# Patient Record
Sex: FEMALE | Race: WHITE | NOT HISPANIC OR LATINO | Employment: OTHER | URBAN - METROPOLITAN AREA
[De-identification: names, ages, dates, MRNs, and addresses within clinical notes are randomized per-mention and may not be internally consistent; named-entity substitution may affect disease eponyms.]

---

## 2022-11-07 ENCOUNTER — TELEPHONE (OUTPATIENT)
Dept: WOUND CARE | Facility: CLINIC | Age: 87
End: 2022-11-07

## 2022-11-07 DIAGNOSIS — L97.529 ULCER OF TOE OF LEFT FOOT, UNSPECIFIED ULCER STAGE (H): Primary | ICD-10-CM

## 2022-11-07 NOTE — TELEPHONE ENCOUNTER
Patient's daughter, Sarah, called requesting a new patient appt for her mom who has a pressure ulcer on the bottom of her foot. She's had two failed skin grafts. Writer informed Sarah of the possible wait time for an appt at the [facility] and offered her the info for the [facility], but she prefers to try the [facility] first.     Sarah [phone]

## 2022-11-07 NOTE — TELEPHONE ENCOUNTER
Consult received via Phone from daughter for wound of the left great toe.    Please schedule with Dr. Rodriguez, Dr. Loya, Dr. Mccabe, or Sheree VARMA at [facility] [place] at next available appointment.    Patient has history of smoking. Per Standing Order patient qualifies for JOSE to be scheduled. JOSE is preferred to be completed before visit with provider but not required. Please do not order an JOSE if scheduled with Dr. Rodriguez.    Is patient a WENCESLAO lift?  PLEASE INQUIRE WHEN MAKING THE APPOINTMENT AND PUT IN APPOINTMENT NOTES    Order and pend Bilateral JOSE without exercise with note to be scheduled by [facility] staff only and include toe pressures.         Once the provider appointment is made please route to [facility] Appointment Scheduling Pool to schedule the JOSE appointment.

## 2022-11-11 ENCOUNTER — TELEPHONE (OUTPATIENT)
Dept: WOUND CARE | Facility: CLINIC | Age: 87
End: 2022-11-11

## 2022-11-11 ENCOUNTER — HOSPITAL ENCOUNTER (OUTPATIENT)
Dept: ULTRASOUND IMAGING | Facility: CLINIC | Age: 87
Discharge: HOME | End: 2022-11-11
Attending: SURGERY
Payer: MEDICARE

## 2022-11-11 ENCOUNTER — HOSPITAL ENCOUNTER (OUTPATIENT)
Dept: WOUND CARE | Facility: CLINIC | Age: 87
Discharge: HOME | End: 2022-11-11
Attending: SURGERY
Payer: MEDICARE

## 2022-11-11 VITALS
WEIGHT: 188.5 LBS | HEIGHT: 65 IN | DIASTOLIC BLOOD PRESSURE: 65 MMHG | SYSTOLIC BLOOD PRESSURE: 109 MMHG | BODY MASS INDEX: 31.4 KG/M2 | HEART RATE: 126 BPM | TEMPERATURE: 97.5 F

## 2022-11-11 DIAGNOSIS — L97.511 SKIN ULCER OF TOE OF RIGHT FOOT, LIMITED TO BREAKDOWN OF SKIN (H): ICD-10-CM

## 2022-11-11 DIAGNOSIS — L97.511 SKIN ULCER OF TOE OF RIGHT FOOT, LIMITED TO BREAKDOWN OF SKIN (H): Primary | ICD-10-CM

## 2022-11-11 DIAGNOSIS — L97.522 CHRONIC TOE ULCER, LEFT, WITH FAT LAYER EXPOSED (H): ICD-10-CM

## 2022-11-11 PROBLEM — R60.9 DEPENDENT EDEMA: Status: ACTIVE | Noted: 2022-04-26

## 2022-11-11 PROBLEM — D51.9 B12 DEFICIENCY ANEMIA: Status: ACTIVE | Noted: 2022-07-20

## 2022-11-11 PROBLEM — K92.2 GASTROINTESTINAL HEMORRHAGE: Status: ACTIVE | Noted: 2022-08-18

## 2022-11-11 PROBLEM — I48.91 NEW ONSET ATRIAL FIBRILLATION (H): Status: ACTIVE | Noted: 2022-06-17

## 2022-11-11 PROBLEM — N18.31 STAGE 3A CHRONIC KIDNEY DISEASE (H): Status: ACTIVE | Noted: 2022-04-26

## 2022-11-11 PROBLEM — E87.1 HYPONATREMIA: Status: ACTIVE | Noted: 2022-06-13

## 2022-11-11 PROBLEM — I48.19 PERSISTENT ATRIAL FIBRILLATION (H): Status: ACTIVE | Noted: 2022-06-17

## 2022-11-11 PROCEDURE — 99204 OFFICE O/P NEW MOD 45 MIN: CPT | Mod: 25 | Performed by: SURGERY

## 2022-11-11 PROCEDURE — 93922 UPR/L XTREMITY ART 2 LEVELS: CPT

## 2022-11-11 PROCEDURE — 11042 DBRDMT SUBQ TIS 1ST 20SQCM/<: CPT | Performed by: SURGERY

## 2022-11-11 PROCEDURE — G0463 HOSPITAL OUTPT CLINIC VISIT: HCPCS | Mod: 25

## 2022-11-11 RX ORDER — LATANOPROST 50 UG/ML
SOLUTION/ DROPS OPHTHALMIC
COMMUNITY
Start: 2022-10-31

## 2022-11-11 RX ORDER — PANTOPRAZOLE SODIUM 40 MG/1
TABLET, DELAYED RELEASE ORAL
COMMUNITY
Start: 2022-11-05

## 2022-11-11 RX ORDER — ACETAMINOPHEN 325 MG/1
650 TABLET ORAL
COMMUNITY

## 2022-11-11 RX ORDER — ALLOPURINOL 100 MG/1
100 TABLET ORAL DAILY
COMMUNITY
Start: 2022-09-29

## 2022-11-11 RX ORDER — OXYBUTYNIN CHLORIDE 5 MG/1
5 TABLET ORAL 2 TIMES DAILY
COMMUNITY
Start: 2022-10-28 | End: 2022-12-27

## 2022-11-11 RX ORDER — ASPIRIN 325 MG
325 TABLET ORAL
COMMUNITY
End: 2022-12-27

## 2022-11-11 RX ORDER — BUMETANIDE 2 MG/1
2 TABLET ORAL DAILY
COMMUNITY
Start: 2022-09-11

## 2022-11-11 RX ORDER — SILVER SULFADIAZINE 10 MG/G
1 CREAM TOPICAL
COMMUNITY
Start: 2022-09-21 | End: 2022-12-27

## 2022-11-11 RX ORDER — FOLIC ACID 1 MG/1
1000 TABLET ORAL DAILY
COMMUNITY
Start: 2022-09-19

## 2022-11-11 RX ORDER — DOCUSATE SODIUM 100 MG/1
100 CAPSULE, LIQUID FILLED ORAL
COMMUNITY

## 2022-11-11 RX ORDER — METOPROLOL SUCCINATE 100 MG/1
100 TABLET, EXTENDED RELEASE ORAL 2 TIMES DAILY
COMMUNITY
Start: 2022-11-02

## 2022-11-11 RX ORDER — AMOXICILLIN 500 MG/1
CAPSULE ORAL
COMMUNITY
Start: 2022-11-03

## 2022-11-11 RX ORDER — POTASSIUM CHLORIDE 1500 MG/1
TABLET, EXTENDED RELEASE ORAL
COMMUNITY
Start: 2022-11-02

## 2022-11-11 RX ORDER — CLOPIDOGREL BISULFATE 75 MG/1
75 TABLET ORAL DAILY
COMMUNITY
Start: 2022-11-04

## 2022-11-11 RX ORDER — CIPROFLOXACIN 500 MG/1
TABLET, FILM COATED ORAL
COMMUNITY
Start: 2022-06-13 | End: 2022-12-27

## 2022-11-11 NOTE — PROGRESS NOTES
[facility] [place] Progress Note    Subject: Tiffanie Merlos is an 87-year-old independent woman who lives in [facility].  She is the daughter of Sarah Matamoros RN  F[employer]  PACU nurse.  Patient developed a blister and ulceration of her left great toe in approximately June 2022.  She developed marked swelling and sepsis.  Admitted to [facility] where she was found to be in atrial fibrillation with congestive heart failure and multiple other medical issues.  Placed on medications for heart rate and intravenous antibiotics for the toe ulcer.  Toe ulcer has failed to heal.  She had been treating this with her local physicians and wound care specialist with a placed several types of dressings and amniotic type grafts that have failed to heal.        Work-up revealed no evidence of osteomyelitis.    She had the atrial fibrillation with a rapid ventricular response.  Watchman procedure performed 11/2/2022.    11/3/2022 laboratory sodium= 140   K= 4.5   Glucose= 116 SCr=1.20         Ca=8.2    Quit smoking 1976.  No history of PAD    With the ongoing toe issues her daughter brought her to live with her in the [facility].  She is basically homebound with limited ambulation.  Empirically placed on Cipro several days ago.    Thought she should be evaluated in the wound clinic.  Stage IIIa kidney disease  PMH: No past medical history on file.  Patient Active Problem List   Diagnosis     Age-related cataract of right eye     B12 deficiency anemia     Cellulitis of left lower extremity     Dependent edema     Hereditary and idiopathic peripheral neuropathy     Hypertension     Hyponatremia     Idiopathic chronic gout of left foot without tophus     Persistent atrial fibrillation (H)     Other urinary incontinence     Skin ulcer of toe of right foot, limited to breakdown of skin (H)     Stage 3a chronic kidney disease (H)     Gastrointestinal hemorrhage     Social Hx:   Social History      Socioeconomic History     Marital status:      Spouse name: Not on file     Number of children: Not on file     Years of education: Not on file     Highest education level: Not on file   Occupational History     Not on file   Tobacco Use     Smoking status: Not on file     Smokeless tobacco: Not on file   Substance and Sexual Activity     Alcohol use: Not on file     Drug use: Not on file     Sexual activity: Not on file   Other Topics Concern     Not on file   Social History Narrative     Not on file     Social Determinants of Health     Financial Resource Strain: Not on file   Food Insecurity: Not on file   Transportation Needs: Not on file   Physical Activity: Not on file   Stress: Not on file   Social Connections: Not on file   Intimate Partner Violence: Not on file   Housing Stability: Not on file       Surgical Hx: No past surgical history on file.    Allergies:    Allergies   Allergen Reactions     Ibuprofen Rash     ITCHY rash!  ITCHY rash!       Meperidine Nausea and Vomiting     Vomit x 3 days.  Vomit x 3 days.       Oxybutynin Rash     RASH from PATCH  RASH from PATCH       Pregabalin Swelling       Medications:   Current Outpatient Medications   Medication     silver sulfADIAZINE (SILVADENE) 1 % external cream     acetaminophen (TYLENOL) 325 MG tablet     allopurinol (ZYLOPRIM) 100 MG tablet     amoxicillin (AMOXIL) 500 MG capsule     aspirin (ASA) 325 MG tablet     bumetanide (BUMEX) 2 MG tablet     cholecalciferol 50 MCG (2000 UT) tablet     ciprofloxacin (CIPRO) 500 MG tablet     clopidogrel (PLAVIX) 75 MG tablet     diphenhydrAMINE-acetaminophen (TYLENOL PM)  MG tablet     docusate sodium (DSS) 100 MG capsule     folic acid (FOLVITE) 1 MG tablet     latanoprost (XALATAN) 0.005 % ophthalmic solution     metoprolol succinate ER (TOPROL XL) 100 MG 24 hr tablet     oxybutynin (DITROPAN) 5 MG tablet     pantoprazole (PROTONIX) 40 MG EC tablet     potassium chloride ER (K-TAB) 20 MEQ CR  "tablet     Probiotic Product (PROBIOTIC GUMMIES PO)     vitamin B-12 (CYANOCOBALAMIN) 1000 MCG tablet     No current facility-administered medications for this encounter.       Labs: No results for input(s): ALBUMIN, HGB, INR, WBC, A1C, CRP in the last 99135 hours.    Invalid input(s): PREALBUMIN,  GLUCOSE, MICROBIO  No results found for: CR  No results found for: GFRESTIMATED  No results found for: GFRESTBLACK  No results found for: WBC  No results found for: CR     Nutrition requirements were discussed with patient today.  Objective:  /65 (BP Location: Left arm, Patient Position: Sitting)   Pulse (!) 126   Temp 97.5  F (36.4  C) (Temporal)   Ht 1.651 m (5' 5\")   Wt 85.5 kg (188 lb 8 oz)   BMI 31.37 kg/m         General:  Patient is alert and orientated, no acute distress.  Very conversant.  Comfortable.  Here with her daughter.  Weight= 85.5 kg.  BMI =31.4 kg meter square     HEENT= no carotid bruits.  Supple neck.  Chest= clear to auscultation  Cardiovascular= somewhat rapid rate but fairly regular.  No murmur.  Abdomen= soft, nontender.   Mildly obese     Vascular: Difficult to palpate popliteal or pedal pulses bilaterally.  +2 pitting edema in both calfs  Pes planus with orthotics and shoes  Very superficial ulcer tip of right great toe  Ulceration over left posterior medial great toe.  Full-thickness ulcer.  Loose nonvascularized skin around ulcer.  No deep structures involved.  No border significant erythema.  Decrease sensation to ankle region bilaterally.  No major foot deformities.    Bedside Doppler with probable biphasic signals in both posterior tibial arteries but decreased dorsalis pedis arteries.  Somewhat more difficult to determine due to edema.    Procedure:   Patient was determined to be capable of making their own medical decisions and informed consent was obtained, topical anesthetic of 4% lidocaine was applied, debridement was performed.  Using a #15 blade scalpel was used to " debride ulcer down to and including subcutaneous tissue.  Remove the overlying nonviable skin.  Debrided the base of the wound with crosshatching with healthy bleeding tissue noted.  Total cm squared debrided was 13. Bleeding controlled with light pressure. Patient tolerated procedure well.    Impression: #1.  Chronic nonhealing left great toe ulcer involving subcutaneous tissue.  Possible ongoing low-grade infection on Cipro.  Wound is been debrided today down to viable tissue with crosshatching.  We will initiate dressings with  Vashe soaks for 5 to 10 minutes daily followed by PleuroGel and endoform antimicrobial to be changed daily.  We will try to get home health care involved.  Her daughter is a nurse and can help with dressings on the other days.    #2.  I am concerned about her arterial blood supply which may be reason this ulcer is failed to heal.  We will perform ABIs    #3.  Empirically placed on Cipro before her arrival and will finish out this course of antibiotics.  No obvious infection at this time.  The ulceration appears to be superficial not involving the fascia/tendon/bone.  Apparently work-up in the past revealed no osteomyelitis but this information has not available to me on care everywhere.    #4.  Bilateral leg swelling.  This may be lymphedema.  Apparently this was very severe earlier in the year when she was hospitalized with still present and more prominent with her feet dependent.  This could also be a factor of her poor healing.  We will place her today on Spandagrip and get have given her information for a purple stripe EdemaWear to wear on both legs which will be much easier for her to use chronically for the edema then a compression stocking will avoid any pressure on the great toe.  As mentioned she does have orthotics to wear but presently has very limited walking.      #5.  No obvious nutritional issues.  However, nutritional supplements and vitamin supplements are recommended to  aid in wound healing and information given on this.    #6.  Following her watchman procedure she is on chronic aspirin and Plavix which will be continued.  Also using Bumex for her edema..  No evidence of significant diabetes.  Her neuropathy is felt to be idiopathic per records.      Barriers to healing include: Edema, proper offloading,  nutrition. Patient education provided on each.   Plan:  We will dress the wounds with Vashe soaks.  PleuroGel/endoform antimicrobial daily.    Will obtain vascular testing.  Patient will return to the clinic in 2 weeks time.     Over 45 minutes with patient today.         Hakeem Mandujano Eliot on 11/11/2022 at 10:59 AM        Dictated using Dragon voice recognition software which may result in transcription errors          Further instructions from your care team         Tiffanie Merlos      1935    A DME order for supplies has been placed to [facility]. If there are any issues with your order including not receiving the order please call [facility] at [phone] option 3. They can also provide a tracking number for you if you had supplies shipped to you.    Continue Ciprofloxacin until complete.    Wound Dressing Change: left great toe:  -Cleanse with mild unscented soap and water  -Apply Vashe-moistened gauze, let sit for 10 minutes, remove gauze (do not rinse)   -Moisturize skin on legs with high quality, hypoallergenic moisturizer  -Apply small amount Plurogel to 1/2 Endoform Antimicrobial (cut-to-fit size of wound)  -Cover with 2x2 gauze & 2 x 2/34 inch Medipore+Pad   -Then apply purple Stripe EdemaWear from toes to knee.   Change daily   (If home care approved, home care to change 3 times weekly and daughter to change the other days)    Wound Dressing Change: right great toe:  -Cleanse with mild unscented soap and water  -Apply Vashe-moistened gauze, let sit for 10 minutes, remove gauze (do not rinse)   -Moisturize skin on legs with high  quality, hypoallergenic moisturizer  -Apply small amount Plurogel to 1/8 Endoform Antimicrobial (cut-to-fit size of wound)  -Cover with BandAid   -Then apply purple Stripe EdemaWear from toes to knee.   Change daily   (If home care approved, home care to change 3 times weekly and daughter to change the other days)    EdemaWear should be worn 24/7 unless bathing/showering or changing the dressing. You will wash and reuse the EdemaWear. DO NOT CUT THE EDEMAWEAR. IF IT IS TOO LONG THEN CUFF THE EDEMAWEAR (the EdemaWear can shrink length wise with washing).     To Order more plurogel: shop.[employer] or call [phone] to place an order for 0.7 oz tube  Use discount code PLUROHEALS (all caps) at checkout in the discount code section to decrease price to $55     Limit walking for now. When you sit raise your ankle above your hips to promote wound healing.       Hakeem Mccabe M.D. November 11, 2022    ROUTINE FOOT CARE (NAIL TRIMMING / CALLUSES)    Go to afcna.org (American Foot Care Nurses Association) and search for providers near you.  Otherwise, this is a list we have gathered of  recommended locations/providers in [place].    [facility]   [phone]   [facility]  [phone]  [email or web address]   [facility]  [phone]  [facility] 15 mile radius [facility]  [phone]  [email or web address]   [facility]  [address] [place] [address] [phone] Kurt Henao DPM  [address] [place] [address] [phone]   [facility]  [phone] [address] [place] [address]  [facility]  Dr. Virgil Wong  [phone]  [facility]  [phone]  [place] [facility] [place] Locations  (does not take BCBS) FYI:  *Some providers accept insurance while others are out of pocket. Please contact them for details*        Call us at [phone] if you have any questions about your wounds, have redness or swelling  around your wound, have a fever of 101 or greater or if you have any other problems or concerns. We answer the phone Monday through Friday 8 am to 4 pm, please leave a message as we check the voicemail frequently throughout the day.     If you had a positive experience please indicate that on your patient satisfaction survey form that [facility] will be sending you.    It was a pleasure meeting with you today.  Thank you for allowing me and my team the privilege of caring for you today.  YOU are the reason we are here, and I truly hope we provided you with the excellent service you deserve.  Please let us know if there is anything else we can do for you so that we can be sure you are leaving completely satisfied with your care experience.      If you have any billing related questions please call the [facility] Business office at [phone]. The clinic staff does not handle billing related matters.    If you are scheduled to have a follow up appointment, you will receive a reminder call the day before your visit. On the appointment day please arrive 15 minutes prior to your appointment time. If you are unable to keep that appointment, please call the clinic to cancel or reschedule. If you are more than 10 minutes late or greater for your appointment, the clinic policy is that you may be asked to reschedule.

## 2022-11-11 NOTE — PROGRESS NOTES
Patient arrived for wound care visit. Certified Wound Care Nurse time spent evaluating patient record, completed a full evaluation and documented wound(s) & raheem-wound skin; provided recommendation based on treatment plan. Applied dressing, reviewed discharge instructions, patient education, and discussed plan of care with appropriate medical team staff members and patient and/or family members.

## 2022-11-11 NOTE — TELEPHONE ENCOUNTER
Please schedule for JOSE at [facility]   Patient is in 2 layer wrap:  No  Patient is a alie transfer : No  Approximate time to get patient onto exam table: 3-5 min    Routing to the [facility] appointment scheduling pool     Hakeem Mccabe M.D.

## 2022-11-11 NOTE — PROGRESS NOTES
Home care referrals sent to:  [facility] Phone: [phone] Fax: [phone]  [facility] phone [phone] fax [phone]  [facility] Phone [phone] Fax [phone]  [facility] 24 Hour [facility] Phone: [phone] Fax: [phone]  [facility] Phone: [phone] Fax :[phone]   [facility] Phone: [phone] Fax [phone]  Fax for new referrals: [phone]   [facility] Phone:[phone]  Fax:[phone]   [facility] Phone [phone] Fax [phone]  [facility] Phone: [phone] Fax: [phone]  [facility] Phone: [phone] Fax [phone]    NOTE:  Patient is staying with daughter at:  [address] [address]    Will await response

## 2022-11-11 NOTE — TELEPHONE ENCOUNTER
Spoke with Juanito.  She is asking if we can add on this ultrasound today 11/11/22.    [facility] approved adding it to the [facility] schedule.    Scheduled JOSE on 11/11/22 @ noon.

## 2022-11-11 NOTE — DISCHARGE INSTRUCTIONS
Tiffanie Merlos      1935    A DME order for supplies has been placed to [facility]. If there are any issues with your order including not receiving the order please call [facility] at [phone] option 3. They can also provide a tracking number for you if you had supplies shipped to you.    Continue Ciprofloxacin until complete.    Vein Formula 1000mg. Take one (1) 500mg capsule twice daily. Order from www.Dimmi or [employer] or call [phone]. (The 1000mg is two (2) 500mg capsules)     A diet high in protein is important for wound healing, we recommend getting 90 grams of protein per day. Taking protein shakes or bars are a good way to get extra protein in your diet.     Wound Dressing Change: left great toe:  -Cleanse with mild unscented soap and water  -Apply Vashe-moistened gauze, let sit for 10 minutes, remove gauze (do not rinse)   -Moisturize skin on legs with high quality, hypoallergenic moisturizer  -Apply small amount Plurogel to 1/2 Endoform Antimicrobial (cut-to-fit size of wound)  -Cover with 2x2 gauze & 2 x 2/34 inch Medipore+Pad   -Then apply purple Stripe EdemaWear from toes to knee.   Change daily   (If home care approved, home care to change 3 times weekly and daughter to change the other days)    Wound Dressing Change: right great toe:  -Cleanse with mild unscented soap and water  -Apply Vashe-moistened gauze, let sit for 10 minutes, remove gauze (do not rinse)   -Moisturize skin on legs with high quality, hypoallergenic moisturizer  -Apply small amount Plurogel to 1/8 Endoform Antimicrobial (cut-to-fit size of wound)  -Cover with BandAid   -Then apply purple Stripe EdemaWear from toes to knee.   Change daily   (If home care approved, home care to change 3 times weekly and daughter to change the other days)    EdemaWear should be worn 24/7 unless bathing/showering or changing the dressing. You will wash and reuse the EdemaWear. DO NOT CUT THE EDEMAWEAR.  IF IT IS TOO LONG THEN CUFF THE EDEMAWEAR (the EdemaWear can shrink length wise with washing).     To Order more plurogel: shop.[email or web address] or call [phone] to place an order for 0.7 oz tube  Use discount code PLUROHEALS (all caps) at checkout in the discount code section to decrease price to $55     Limit walking for now. When you sit raise your ankle above your hips to promote wound healing.       Hakeem Mccabe M.D. November 11, 2022    ROUTINE FOOT CARE (NAIL TRIMMING / CALLUSES)    Go to afcna.org (American Foot Care Nurses Association) and search for providers near you.  Otherwise, this is a list we have gathered of  recommended locations/providers in [place].    [facility]   [phone]   [facility]  [phone]  [email or web address]   [facility]  [phone]  [place] + [place] mile radius [facility]  [phone]  [facility]   [facility]  [address] [place] [address]  [phone] Kurt Henao, DPIRIS  [address] [address] [phone]   [facility]  [phone] [address] [place] [address]  [facility]  Dr. Virgil Wong  [phone]  [facility]  [phone]  [place] [facility] [place] Locations  (does not take BCBS) FYI:  *Some providers accept insurance while others are out of pocket. Please contact them for details*        Call us at [phone] if you have any questions about your wounds, have redness or swelling around your wound, have a fever of 101 or greater or if you have any other problems or concerns. We answer the phone Monday through Friday 8 am to 4 pm, please leave a message as we check the voicemail frequently throughout the day.     If you had a positive experience please indicate that on your patient satisfaction survey form that [facility] will be sending you.    It was a pleasure meeting with you today.  Thank you for allowing me and my team the  privilege of caring for you today.  YOU are the reason we are here, and I truly hope we provided you with the excellent service you deserve.  Please let us know if there is anything else we can do for you so that we can be sure you are leaving completely satisfied with your care experience.      If you have any billing related questions please call the [facility] Business office at [phone]. The clinic staff does not handle billing related matters.    If you are scheduled to have a follow up appointment, you will receive a reminder call the day before your visit. On the appointment day please arrive 15 minutes prior to your appointment time. If you are unable to keep that appointment, please call the clinic to cancel or reschedule. If you are more than 10 minutes late or greater for your appointment, the clinic policy is that you may be asked to reschedule.

## 2022-11-11 NOTE — ADDENDUM NOTE
Encounter addended by: Sheron Shelton RN on: 11/11/2022 2:30 PM   Actions taken: Clinical Note Signed

## 2022-11-14 ENCOUNTER — MEDICAL CORRESPONDENCE (OUTPATIENT)
Dept: HEALTH INFORMATION MANAGEMENT | Facility: CLINIC | Age: 87
End: 2022-11-14

## 2022-11-14 ENCOUNTER — TELEPHONE (OUTPATIENT)
Dept: OTHER | Facility: CLINIC | Age: 87
End: 2022-11-14

## 2022-11-15 ENCOUNTER — TELEPHONE (OUTPATIENT)
Dept: WOUND CARE | Facility: CLINIC | Age: 87
End: 2022-11-15

## 2022-11-15 NOTE — TELEPHONE ENCOUNTER
[facility] can only obtain Purachol which is a pure Collagen product and will be able to use this instead of the Endoform.

## 2022-11-15 NOTE — TELEPHONE ENCOUNTER
Iman (home health care nurse) called to update that she went through admission with the patient 11/13 and ordered supplies per the recent wound care order. However, she is requesting Puracol instead of Endoform due to billing issues with Endoform.     Please call [phone]

## 2022-11-17 ENCOUNTER — TELEPHONE (OUTPATIENT)
Dept: WOUND CARE | Facility: CLINIC | Age: 87
End: 2022-11-17

## 2022-11-17 NOTE — TELEPHONE ENCOUNTER
Called Flako, she was requesting both PT and OT orders.  Asked her what are the orders for, she stated it was mainly for upper body strengthening.  Gave verbal order for OT only for upper body strengthening.  Regarding PT order, patient will be here on Monday, 21st, and will address PT order then, since AVS states to limit walking to affected area.  Flako understood and agreed to plan.

## 2022-11-21 ENCOUNTER — HOSPITAL ENCOUNTER (OUTPATIENT)
Dept: WOUND CARE | Facility: CLINIC | Age: 87
Discharge: HOME | End: 2022-11-21
Attending: SURGERY | Admitting: SURGERY
Payer: MEDICARE

## 2022-11-21 VITALS — SYSTOLIC BLOOD PRESSURE: 103 MMHG | TEMPERATURE: 98.3 F | DIASTOLIC BLOOD PRESSURE: 65 MMHG | HEART RATE: 111 BPM

## 2022-11-21 DIAGNOSIS — L97.522 CHRONIC TOE ULCER, LEFT, WITH FAT LAYER EXPOSED (H): ICD-10-CM

## 2022-11-21 PROCEDURE — 11042 DBRDMT SUBQ TIS 1ST 20SQCM/<: CPT | Performed by: SURGERY

## 2022-11-21 NOTE — ADDENDUM NOTE
Encounter addended by: Nati Hughes RN on: 11/21/2022 1:35 PM   Actions taken: Edited Discharge Instructions

## 2022-11-21 NOTE — DISCHARGE INSTRUCTIONS
Tiffanie Merlos      1935    A DME order for supplies not entered, supplies ordered by [facility]  [facility] care fax [phone]  to change 3 times weekly and daughter Sarah to change the other days     Vein Formula 1000mg. Take one (1) 500mg capsule twice daily. Order from  www.[employer] or [employer] or call [phone]. (The 1000mg is two (2)  500mg capsules)    A diet high in protein is important for wound healing, we recommend getting 90 grams  of protein per day. Taking protein shakes or bars are a good way to get extra protein in  your diet.    Wound Dressing Change:   Left great toe  -Cleanse with mild unscented soap and water  -Apply Vashe-moistened gauze, let sit for 10 minutes, remove gauze (do not rinse)  -Moisturize skin on legs with high quality, hypoallergenic moisturizer  -Apply small amount Plurogel to 1/2 Endoform Antimicrobial (cut-to-fit size of wound)  -Cover with 2x2 gauze & 2 x 2/34 inch Medipore+Pad  -Then apply purple Stripe EdemaWear from toes to knee.  Change daily     RIGHT FOOT   Right 2nd toe  -Cleanse with mild unscented soap and water  -Apply Vashe-moistened gauze, let sit for 10 minutes, remove gauze (do not rinse)  -Apply small amount Plurogel to 1/2 Endoform Antimicrobial (cut-to-fit size of wound)  -Cover with 2x2 gauze & 2 x 2/34 inch Medipore+Pad  -Then apply purple Stripe EdemaWear from toes to knee.  Change daily    Right great toe:  -Cleanse with mild unscented soap and water  -Apply Vashe-moistened gauze, let sit for 10 minutes, remove gauze (do not rinse)   -Cover with Band Aid  -Then apply purple Stripe EdemaWear from toes to knee.  Change daily     EdemaWear should be worn 24/7 unless bathing/showering or changing the dressing.  You will wash and reuse the EdemaWear. DO NOT CUT THE EDEMAWEAR. IF IT IS TOO  LONG THEN CUFF THE EDEMAWEAR (the EdemaWear can shrink length wise with  washing).    To Order more plurogel: shop.[employer] or call  [phone] to place an order  for 0.7 oz tube  Use discount code PLUROHEALS (all caps) at checkout in the discount code section to  decrease price to $55     ROUTINE FOOT CARE (NAIL TRIMMING / CALLUSES)  Go to [email or web address] (American Foot Care Nurses Association) and search for providers near  you.  Otherwise, this is a list we have gathered of recommended locations/providers in [place].  [facility]  [phone]  [facility]  [phone]  [email or web address]  [facility] [employer]  [phone]  [facility] 15 mile radius  [facility]  [phone]  [facility].     Hakeem Mccabe M.D. November 21, 2022    Call us at [phone] if you have any questions about your wounds, have redness or swelling around your wound, have a fever of 101 or greater or if you have any other problems or concerns. We answer the phone Monday through Friday 8 am to 4 pm, please leave a message as we check the voicemail frequently throughout the day.     If you are scheduled to have a follow up appointment, you will receive a reminder call the day before your visit. On the appointment day please arrive 15 minutes prior to your appointment time. If you are unable to keep that appointment, please call the clinic to cancel or reschedule. If you are more than 10 minutes late or greater for your appointment, the clinic policy is that you may be asked to reschedule.

## 2022-11-21 NOTE — PROGRESS NOTES
[facility] [place] Progress Note    Subject: Tiffanie Merlos and her daughter returns to see us today after evaluation a week ago.  She developed a nonhealing ulceration in her left great toe along with a superficial ulcer over the dorsal right second toe.  No history of osteomyelitis.  Prior evaluation.  Wounds were debrided at that time.  Treated with pleural gel and endoform antimicrobial.  Daily Vashe soaks.   recommended EdemaWear for chronic swelling.  Nutritional supplements and vitamins.  And then a course of Cipro that she was finishing.      We were concerned about her circulation due to the nonhealing nature.  ABIs were performed being 1.18/1.28 on the right with a toe index of 0.99.  No left this is 0.99/0.76 with a toe index.   This is indicative of adequate blood supply for healing.    With the hammertoe deformity on the right foot they have been avoiding shoes to prevent any pressure and this has helped was very superficial ulcer.    She is using the purple stripe EdemaWear which has been quite helpful.  Taking the vein formula vitamin supplements along with nutritional supplements.    She does have shoes that have Velcro straps that have an adequate toebox for the left foot potentially too tight for the right due to the hammertoe.  There is a over-the-counter insert in both of these.         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  Exam:  /65   Pulse 111   Temp 98.3  F (36.8  C) (Temporal)   Wound (used by [facility] only) 11/11/22 1014 Left 1 toe neuropathic ulcer (Active)   Thickness/Stage full thickness 11/21/22 0749   Base pink;slough 11/21/22 0749   Periwound macerated 11/11/22 1000   Periwound Temperature cool 11/21/22 0749   Periwound Skin Turgor soft 11/21/22 0749   Edges open 11/21/22 0749   Length (cm) 0.2 11/21/22 0749   Width (cm) 0.25 11/21/22 0749   Depth (cm) 0.1 11/21/22 0749   Wound (cm^2) 0.05 cm^2 11/21/22 0749   Wound Volume (cm^3) 0.01 cm^3 11/21/22 0749   Wound healing % 99.59 11/21/22 0749   Drainage Characteristics/Odor serosanguineous 11/21/22 0749   Drainage Amount moderate 11/21/22 0749   Care, Wound debrided 11/21/22 0749       Wound (used by [facility] only) 11/11/22 1019 Right 1 toe neuropathic ulcer (Active)   Thickness/Stage partial thickness 11/21/22 0749   Base pink;epithelialization 11/21/22 0749   Periwound intact 11/21/22 0749   Periwound Temperature warm 11/21/22 0749   Periwound Skin Turgor soft 11/21/22 0749   Edges open 11/21/22 0749   Length (cm) 0.2 11/21/22 0749   Width (cm) 0.3 11/21/22 0749   Depth (cm) 0.1 11/21/22 0749   Wound (cm^2) 0.06 cm^2 11/21/22 0749   Wound Volume (cm^3) 0.01 cm^3 11/21/22 0749   Wound healing % -500 11/21/22 0749   Drainage Characteristics/Odor serosanguineous 11/11/22 1000   Drainage Amount none 11/21/22 0749   Care, Wound non-select wound debridement performed 11/21/22 0749       Wound (used by [facility] only) 11/21/22 0749 Right 2 toe (Active)   Thickness/Stage full thickness 11/21/22 0749   Base pink 11/21/22 0749   Length (cm) 0.5 11/21/22 0749   Width (cm) 0.3 11/21/22 0749   Depth (cm) 0.1 11/21/22 0749   Wound (cm^2) 0.15 cm^2 11/21/22 0749   Wound Volume (cm^3) 0.02 cm^3 11/21/22 0749   Drainage Characteristics/Odor serosanguineous 11/21/22 0749   Drainage Amount moderate 11/21/22 0749   Care, Wound non-select wound debridement performed 11/21/22 0749      Alert and appropriate.  They are with her daughter.  Very talkative and pleasant.  Wearing the EdemaWear and affects her quite noticeable with essentially no swelling.  Essentially healed ulcer over the right dorsal second toe with no erythema.  Improvement of the left great toe.  Mild amount of bioburden.  No erythema.  No undermining.    Procedure:   Patient was determined to be capable of making their own medical decisions and informed consent was obtained. Topical anesthetic of 4% lidocaine was applied, debridement was performed using a #15 blade down to and including subcutaneous tissue.  Removed all bioburden to left great toe ulcer.  Skin edges debrided.  Crosshatching performed.  No deep structures involved.  Bleeding controlled with light pressure. Patient tolerated procedure well.    Impression: #1.  Left great toe ulcer is slowly improving.  No evidence of significant arterial insufficiency that would prevent healing and this was discussed.  Swelling well controlled with EdemaWear.  Would continue with the Vashe soaks followed by a thin layer PleuroGel and endoform antimicrobial on a daily basis.  Discussed the possibility of split-thickness skin graft in the future to aid in healing (as is the amniotic grafts in the past but since we have a very healthy granulation bed I do not feel that these will result in healing and this was discussed).   Please offload the toe on the left since it is more plantar-based.  I recommend using the present shoes and inserts but using a Dr. Williamson's and cutting opening over the great toe.     #2.  Almost resolved right pressure ulcer over the second dorsal toe from the hammertoe deformity.  I have recommended that her daughter who is an RN got an opening and that shoe to make sure there is no pressure at all over that site.        Plan: We will dress the wounds with above.  Patient will return to the clinic in 2 weeks time      Hakeem Mccabe MD on 11/21/2022  at 7:56 AM      Dictated using Dragon voice recognition software which may result in transcription errors          Further instructions from your care team       Tiffanie NENA Gaurang      1935        A DME order for supplies not entered  Interim? home care to change 3 times weekly and daughter Sarah to change the other days     Vein Formula 1000mg. Take one (1) 500mg capsule twice daily. Order from  www.[employer] or [employer] or call [phone]. (The 1000mg is two (2)  500mg capsules)    A diet high in protein is important for wound healing, we recommend getting 90 grams  of protein per day. Taking protein shakes or bars are a good way to get extra protein in  your diet.    Wound Dressing Change:   Left great toe  -Cleanse with mild unscented soap and water  -Apply Vashe-moistened gauze, let sit for 10 minutes, remove gauze (do not rinse)  -Moisturize skin on legs with high quality, hypoallergenic moisturizer  -Apply small amount Plurogel to 1/2 Endoform Antimicrobial (cut-to-fit size of wound)  -Cover with 2x2 gauze & 2 x 2/34 inch Medipore+Pad  -Then apply purple Stripe EdemaWear from toes to knee.  Change daily       Wound Dressing Change:   RIGHT FOOT   Right 2nd toe  -Cleanse with mild unscented soap and water  -Apply Vashe-moistened gauze, let sit for 10 minutes, remove gauze (do not rinse)  -Apply small amount Plurogel to 1/2 Endoform Antimicrobial (cut-to-fit size of wound)  -Cover with 2x2 gauze & 2 x 2/34 inch Medipore+Pad  -Then apply purple Stripe EdemaWear from toes to knee.  Change daily    Right great toe:  -Cleanse with mild unscented soap and water  -Apply Vashe-moistened gauze, let sit for 10 minutes, remove gauze (do not rinse)  -Moisturize skin on legs with high quality, hypoallergenic moisturizer  -Apply small amount Plurogel to 1/8 Endoform Antimicrobial (cut-to-fit size of wound)  -Cover with BandAid    -Then apply purple Stripe EdemaWear from toes to knee.  Change daily  (If home  care approved, home care to change 3 times weekly and daughter to change  the other days)  EdemaWear should be worn 24/7 unless bathing/showering or changing the dressing.  You will wash and reuse the EdemaWear. DO NOT CUT THE EDEMAWEAR. IF IT IS TOO  LONG THEN CUFF THE EDEMAWEAR (the EdemaWear can shrink length wise with  washing).    To Order more plurogel: shop.[employer] or call [phone] to place an order  for 0.7 oz tube  Use discount code PLUROHEALS (all caps) at checkout in the discount code section to  decrease price to $55    Limit walking for now. When you sit raise your ankle above your hips to promote     ROUTINE FOOT CARE (NAIL TRIMMING / CALLUSES)  Go to [email or web address] (American Foot Care Nurses Association) and search for providers near  you.  Otherwise, this is a list we have gathered of recommended locations/providers in [place].  [facility]  [phone]  [facility]  [phone]  [email or web address]  [facility]  [phone]  [place] 15 mile radius  [facility]  [phone]  [facility].     Hakeem Mccabe M.D. November 21, 2022    Call us at [phone] if you have any questions about your wounds, have redness or swelling around your wound, have a fever of 101 or greater or if you have any other problems or concerns. We answer the phone Monday through Friday 8 am to 4 pm, please leave a message as we check the voicemail frequently throughout the day.     If you are scheduled to have a follow up appointment, you will receive a reminder call the day before your visit. On the appointment day please arrive 15 minutes prior to your appointment time. If you are unable to keep that appointment, please call the clinic to cancel or reschedule. If you are more than 10 minutes late or greater for your appointment, the clinic policy is that you may be asked to reschedule.

## 2022-11-28 ENCOUNTER — MEDICAL CORRESPONDENCE (OUTPATIENT)
Dept: HEALTH INFORMATION MANAGEMENT | Facility: CLINIC | Age: 87
End: 2022-11-28

## 2022-11-28 DIAGNOSIS — Z53.9 DIAGNOSIS NOT YET DEFINED: Primary | ICD-10-CM

## 2022-11-28 PROCEDURE — G0180 MD CERTIFICATION HHA PATIENT: HCPCS | Performed by: SURGERY

## 2022-12-05 ENCOUNTER — HOSPITAL ENCOUNTER (OUTPATIENT)
Dept: WOUND CARE | Facility: CLINIC | Age: 87
Discharge: HOME | End: 2022-12-05
Attending: SURGERY | Admitting: SURGERY
Payer: MEDICARE

## 2022-12-05 ENCOUNTER — MEDICAL CORRESPONDENCE (OUTPATIENT)
Dept: HEALTH INFORMATION MANAGEMENT | Facility: CLINIC | Age: 87
End: 2022-12-05

## 2022-12-05 VITALS — DIASTOLIC BLOOD PRESSURE: 74 MMHG | HEART RATE: 108 BPM | TEMPERATURE: 98.2 F | SYSTOLIC BLOOD PRESSURE: 113 MMHG

## 2022-12-05 DIAGNOSIS — L97.222 SKIN ULCER OF LEFT CALF WITH FAT LAYER EXPOSED (H): ICD-10-CM

## 2022-12-05 DIAGNOSIS — L97.522 CHRONIC TOE ULCER, LEFT, WITH FAT LAYER EXPOSED (H): ICD-10-CM

## 2022-12-05 PROCEDURE — 11042 DBRDMT SUBQ TIS 1ST 20SQCM/<: CPT | Performed by: SURGERY

## 2022-12-05 NOTE — DISCHARGE INSTRUCTIONS
Tiffanie Merlos      1935    YOUR NEXT VISIT SCHEDULED ON 12/26/1    A DME order for supplies not entered, supplies ordered by [facility]  [facility] home care fax [phone]  to change 3 times weekly and daughter Sarah to change the other days    Vein Formula 1000mg. Take one (1) 500mg capsule twice daily. Order from  www.[employer] or [employer] or call [phone]. (The 1000mg is two (2)  500mg capsules)  A diet high in protein is important for wound healing, we recommend getting 90 grams  of protein per day. Taking protein shakes or bars are a good way to get extra protein in  your diet.    Wound Dressing Change:    LEFT great toe  -Cleanse with mild unscented soap and water  -Apply Vashe-moistened gauze, let sit for 10 minutes, remove gauze (do not rinse)  -Moisturize skin on legs with high quality, hypoallergenic moisturizer  -Apply small amount Plurogel to 1/2 Endoform Antimicrobial (cut-to-fit size of wound)  -Cover with 2x2 gauze & 2 x 2/34 inch Medipore+Pad  -Then apply purple Stripe EdemaWear from toes to knee.  Change daily    LEFT lateral leg  -Cleanse with mild unscented soap and water  -Apply Vashe-moistened gauze, let sit for 10 minutes, remove gauze (do not rinse)  -Moisturize skin on legs with high quality, hypoallergenic moisturizer  -Apply small amount Plurogel to 1/2 Endoform Antimicrobial (cut-to-fit size of wound)  -Cover with 2x2 gauze & 2 x 2/34 inch Medipore+Pad  -Then apply purple Stripe EdemaWear from toes to knee.  Change daily         RIGHT 2nd toe  -Cleanse with mild unscented soap and water  -Apply Vashe-moistened gauze, let sit for 10 minutes, remove gauze (do not rinse)  - pat dry.   -Cover with 2x2 gauze & 2 x 2/34 inch Medipore+Pad  -Then apply purple Stripe EdemaWear from toes to knee.  Change daily    RIGHT great toe:  -Cleanse with mild unscented soap and water  -Apply Vashe-moistened gauze, let sit for 10 minutes, remove gauze (do not rinse)  - pat dry  -Cover  with Band Aid  -Then apply purple Stripe EdemaWear from toes to knee.  Change daily    EdemaWear should be worn 24/7 unless bathing/showering or changing the dressing.  You will wash and reuse the EdemaWear. DO NOT CUT THE EDEMAWEAR. IF IT IS TOO  LONG THEN CUFF THE EDEMAWEAR (the EdemaWear can shrink length wise with  washing).  To Order more plurogel: shop.[employer] or call [phone] to place an order  for 0.7 oz tube  Use discount code PLUROHEALS (all caps) at checkout in the discount code section to  decrease price to $55    ROUTINE FOOT CARE (NAIL TRIMMING / CALLUSES)  Go to [email or web address] (American Foot Care Nurses Association) and search for providers near  you.  Otherwise, this is a list we have gathered of recommended locations/providers in [place].  [facility]  [phone]  [facility]  [phone]  [email or web address]  [employer]  [phone]  [facility] 15 mile radius  [facility]  [phone]  [facility].     Hakeem Mccabe M.D. December 5, 2022    Call us at [phone] if you have any questions about your wounds, have redness or swelling around your wound, have a fever of 101 or greater or if you have any other problems or concerns. We answer the phone Monday through Friday 8 am to 4 pm, please leave a message as we check the voicemail frequently throughout the day.     If you had a positive experience please indicate that on your patient satisfaction survey form that [facility] will be sending you.    It was a pleasure meeting with you today.  Thank you for allowing me and my team the privilege of caring for you today.  YOU are the reason we are here, and I truly hope we provided you with the excellent service you deserve.  Please let us know if there is anything else we can do for you so that we can be sure you are leaving completely satisfied with your care experience.      If you have any billing related questions please call the [facility] Business office at [phone].  The clinic staff does not handle billing related matters.    If you are scheduled to have a follow up appointment, you will receive a reminder call the day before your visit. On the appointment day please arrive 15 minutes prior to your appointment time. If you are unable to keep that appointment, please call the clinic to cancel or reschedule. If you are more than 10 minutes late or greater for your appointment, the clinic policy is that you may be asked to reschedule.

## 2022-12-05 NOTE — PROGRESS NOTES
[facility] [place] Progress Note    Subject: Tiffanie Merlos and her daughter return for follow-up.  She has a chronic left plantar grade 2 ulcer with adequate blood supply.  Presently with using PleuroGel and endoform antimicrobial with slow improvement.      Some concerns about the hammertoe on the right Second toe with offloading has improved there is not an ulcer.      On Thanksgiving she hit her left proximal anterior lateral calf on the car door.  Despite wearing jeans this did suffer a laceration with a skin partially intact.        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  Exam:  /74   Pulse 108   Temp 98.2  F (36.8  C) (Temporal)   Wound (used by [facility] only) 11/11/22 1014 Left 1 toe neuropathic ulcer (Active)   Thickness/Stage full thickness 12/05/22 0812   Base pink;slough 12/05/22 0812   Periwound macerated 11/11/22 1000   Periwound Temperature cool 12/05/22 0812   Periwound Skin Turgor soft 12/05/22 0812   Edges open 12/05/22 0812   Length (cm) 1.7 12/05/22 0812   Width (cm) 2 12/05/22 0812   Depth (cm) 0.2 12/05/22 0812   Wound (cm^2) 3.4 cm^2 12/05/22 0812   Wound Volume (cm^3) 0.68 cm^3 12/05/22 0812   Wound healing % 72.24 12/05/22 0812   Drainage Characteristics/Odor serosanguineous 12/05/22 0812   Drainage Amount moderate 12/05/22 0812   Care, Wound debrided 11/21/22 0749       Wound (used by OP [facility] only)  11/11/22 1019 Right 1 toe neuropathic ulcer (Active)   Thickness/Stage partial thickness 12/05/22 0812   Base pink;epithelialization 12/05/22 0812   Periwound intact 12/05/22 0812   Periwound Temperature warm 12/05/22 0812   Periwound Skin Turgor soft 12/05/22 0812   Edges open 12/05/22 0812   Length (cm) 0.1 12/05/22 0812   Width (cm) 0.2 12/05/22 0812   Depth (cm) 0.1 12/05/22 0812   Wound (cm^2) 0.02 cm^2 12/05/22 0812   Wound Volume (cm^3) 0 cm^3 12/05/22 0812   Wound healing % -100 12/05/22 0812   Drainage Characteristics/Odor serosanguineous 12/05/22 0812   Drainage Amount none 12/05/22 0812   Care, Wound non-select wound debridement performed 11/21/22 0749       Wound (used by [facility] only) 11/21/22 0749 Right 2 toe (Active)   Thickness/Stage full thickness 12/05/22 0812   Base pink 12/05/22 0812   Length (cm) 0.1 12/05/22 0812   Width (cm) 0.1 12/05/22 0812   Depth (cm) 0.1 12/05/22 0812   Wound (cm^2) 0.01 cm^2 12/05/22 0812   Wound Volume (cm^3) 0 cm^3 12/05/22 0812   Wound healing % 93.33 12/05/22 0812   Drainage Characteristics/Odor serosanguineous 12/05/22 0812   Drainage Amount moderate 12/05/22 0812   Care, Wound non-select wound debridement performed 11/21/22 0749       Wound (used by [facility] only) 12/05/22 0811 Left lower;lateral leg (Active)   Thickness/Stage full thickness 12/05/22 0812   Base purple 12/05/22 0812   Length (cm) 3.2 12/05/22 0812   Width (cm) 3.7 12/05/22 0812   Depth (cm) 0.1 12/05/22 0812   Wound (cm^2) 11.84 cm^2 12/05/22 0812   Wound Volume (cm^3) 1.18 cm^3 12/05/22 0812   Drainage Characteristics/Odor serosanguineous 12/05/22 0812   Drainage Amount moderate 12/05/22 0812     Alert appropriate.  Comfortable.  Here with her daughter.  Left great toe ulcer is slowly improving.  Mild amount of bioburden.  No infection.  Healthy tissue.  No undermining.  Left lateral calf ulcer is back in the photograph has nonviable skin and a moderate amount of bioburden.  No bleeding.  No  cellulitis.        Procedure:   Patient was determined to be capable of making their own medical decisions and informed consent was obtained. Topical anesthetic of 4% lidocaine was applied, debridement was performed using a #15 blade down to and including subcutaneous tissue on the great toe.  This was crosshatched.  Good healthy tissue was noted following debridement with bleeding that stopped with supple pressure.      Also debrided the new ulcer removing the bioburden down to healthy tissue with excellent vascularity and bleeding.  Bleeding controlled with light pressure. Patient tolerated procedure well.    Impression: #1.  Left great toe ulcer is improving.  Would like to heal by secondary intent since split-thickness skin grafting would not be as durable on this weightbearing surface and this is discussed with the family.  Continue with above dressings.     #2.  Involved in both ulcers traumatic ulcer left calf.  Less clinical concern due to location and good blood supply.  Debrided today.  Use similar dressings.           Plan: We will dress the wounds with above to both ulcers  Patient will return to the clinic in one week      Hakeem Mccabe MD on 12/5/2022 at 8:43 AM    Dictated using Dragon voice recognition software which may result in transcription errors      Further instructions from your care team       Tiffanie Merlos      1935        A DME order for supplies not entered, supplies ordered by [facility] care fax [phone]  to change 3 times weekly and daughter Sarah to change the other days    Vein Formula 1000mg. Take one (1) 500mg capsule twice daily. Order from  www.[employer] or [facility] or call [phone]. (The 1000mg is two (2)  500mg capsules)  A diet high in protein is important for wound healing, we recommend getting 90 grams  of protein per day. Taking protein shakes or bars are a good way to get extra protein in  your diet.    Wound Dressing  Change:    LEFT great toe  -Cleanse with mild unscented soap and water  -Apply Vashe-moistened gauze, let sit for 10 minutes, remove gauze (do not rinse)  -Moisturize skin on legs with high quality, hypoallergenic moisturizer  -Apply small amount Plurogel to 1/2 Endoform Antimicrobial (cut-to-fit size of wound)  -Cover with 2x2 gauze & 2 x 2/34 inch Medipore+Pad  -Then apply purple Stripe EdemaWear from toes to knee.  Change daily    LEFT lateral leg  -Cleanse with mild unscented soap and water  -Apply Vashe-moistened gauze, let sit for 10 minutes, remove gauze (do not rinse)  -Moisturize skin on legs with high quality, hypoallergenic moisturizer  -Apply small amount Plurogel to 1/2 Endoform Antimicrobial (cut-to-fit size of wound)  -Cover with 2x2 gauze & 2 x 2/34 inch Medipore+Pad  -Then apply purple Stripe EdemaWear from toes to knee.  Change daily         RIGHT 2nd toe  -Cleanse with mild unscented soap and water  -Apply Vashe-moistened gauze, let sit for 10 minutes, remove gauze (do not rinse)  -Apply small amount Plurogel to 1/2 Endoform Antimicrobial (cut-to-fit size of wound)  -Cover with 2x2 gauze & 2 x 2/34 inch Medipore+Pad  -Then apply purple Stripe EdemaWear from toes to knee.  Change daily    RIGHT great toe:  -Cleanse with mild unscented soap and water  -Apply Vashe-moistened gauze, let sit for 10 minutes, remove gauze (do not rinse)  -Cover with Band Aid  -Then apply purple Stripe EdemaWear from toes to knee.  Change daily    EdemaWear should be worn 24/7 unless bathing/showering or changing the dressing.  You will wash and reuse the EdemaWear. DO NOT CUT THE EDEMAWEAR. IF IT IS TOO  LONG THEN CUFF THE EDEMAWEAR (the EdemaWear can shrink length wise with  washing).  To Order more plurogel: shop.[employer]) or call [phone] to place an order  for 0.7 oz tube  Use discount code PLUROHEALS (all caps) at checkout in the discount code section to  decrease price to $55    ROUTINE FOOT CARE (NAIL  TRIMMING / CALLUSES)  Go to afcna.org ([employer]) and search for providers near  you.  Otherwise, this is a list we have gathered of recommended locations/providers in [place].  [facility]  [phone]  [facility]  [phone]  [email or web address]  [facility]  [phone]  [place] + 15 mile radius  [facility]  [phone]  [employer]ar.     Hakeem Mccabe M.D. December 5, 2022    Call us at [phone] if you have any questions about your wounds, have redness or swelling around your wound, have a fever of 101 or greater or if you have any other problems or concerns. We answer the phone Monday through Friday 8 am to 4 pm, please leave a message as we check the voicemail frequently throughout the day.     If you had a positive experience please indicate that on your patient satisfaction survey form that [facility] will be sending you.    It was a pleasure meeting with you today.  Thank you for allowing me and my team the privilege of caring for you today.  YOU are the reason we are here, and I truly hope we provided you with the excellent service you deserve.  Please let us know if there is anything else we can do for you so that we can be sure you are leaving completely satisfied with your care experience.      If you have any billing related questions please call the [facility] Business office at [phone]. The clinic staff does not handle billing related matters.    If you are scheduled to have a follow up appointment, you will receive a reminder call the day before your visit. On the appointment day please arrive 15 minutes prior to your appointment time. If you are unable to keep that appointment, please call the clinic to cancel or reschedule. If you are more than 10 minutes late or greater for your appointment, the clinic policy is that you may be asked to reschedule.

## 2022-12-14 ENCOUNTER — TELEPHONE (OUTPATIENT)
Dept: CARDIOLOGY | Facility: CLINIC | Age: 87
End: 2022-12-14

## 2022-12-14 DIAGNOSIS — Z95.818 PRESENCE OF LEFT ATRIAL APPENDAGE CLOSURE DEVICE: ICD-10-CM

## 2022-12-14 DIAGNOSIS — I48.19 PERSISTENT ATRIAL FIBRILLATION (H): Primary | ICD-10-CM

## 2022-12-14 NOTE — TELEPHONE ENCOUNTER
Notified of patient's case by Maggie Humphries NP. Patient had Watchman placed 11/2/22 at [facility]. She is now living in [place] with her daughter and will need to do Watchman followup here. Per Maggie, placed order for CTA to assess closure of DAE and follow up with Dr. Carranza. Sent message to Kena who will contact patient's daughter to arrange.

## 2022-12-21 ENCOUNTER — MEDICAL CORRESPONDENCE (OUTPATIENT)
Dept: HEALTH INFORMATION MANAGEMENT | Facility: CLINIC | Age: 87
End: 2022-12-21

## 2022-12-23 ENCOUNTER — HOSPITAL ENCOUNTER (OUTPATIENT)
Dept: CARDIOLOGY | Facility: CLINIC | Age: 87
Discharge: HOME | End: 2022-12-23
Attending: INTERNAL MEDICINE | Admitting: INTERNAL MEDICINE
Payer: MEDICARE

## 2022-12-23 DIAGNOSIS — I48.19 PERSISTENT ATRIAL FIBRILLATION (H): ICD-10-CM

## 2022-12-23 DIAGNOSIS — Z95.818 PRESENCE OF LEFT ATRIAL APPENDAGE CLOSURE DEVICE: ICD-10-CM

## 2022-12-23 LAB
CREAT BLD-MCNC: 1.3 MG/DL (ref 0.5–1)
GFR SERPL CREATININE-BSD FRML MDRD: 40 ML/MIN/1.73M2

## 2022-12-23 PROCEDURE — 82565 ASSAY OF CREATININE: CPT

## 2022-12-23 PROCEDURE — 75572 CT HRT W/3D IMAGE: CPT | Mod: 26 | Performed by: INTERNAL MEDICINE

## 2022-12-23 PROCEDURE — 250N000011 HC RX IP 250 OP 636: Performed by: INTERNAL MEDICINE

## 2022-12-23 PROCEDURE — G1010 CDSM STANSON: HCPCS

## 2022-12-23 PROCEDURE — G1010 CDSM STANSON: HCPCS | Performed by: INTERNAL MEDICINE

## 2022-12-23 RX ORDER — ONDANSETRON 2 MG/ML
4 INJECTION INTRAMUSCULAR; INTRAVENOUS
Status: DISCONTINUED | OUTPATIENT
Start: 2022-12-23 | End: 2022-12-24 | Stop reason: HOSPADM

## 2022-12-23 RX ORDER — METHYLPREDNISOLONE SODIUM SUCCINATE 125 MG/2ML
125 INJECTION, POWDER, LYOPHILIZED, FOR SOLUTION INTRAMUSCULAR; INTRAVENOUS
Status: DISCONTINUED | OUTPATIENT
Start: 2022-12-23 | End: 2022-12-24 | Stop reason: HOSPADM

## 2022-12-23 RX ORDER — DIPHENHYDRAMINE HYDROCHLORIDE 50 MG/ML
25-50 INJECTION INTRAMUSCULAR; INTRAVENOUS
Status: DISCONTINUED | OUTPATIENT
Start: 2022-12-23 | End: 2022-12-24 | Stop reason: HOSPADM

## 2022-12-23 RX ORDER — ACYCLOVIR 200 MG/1
0-1 CAPSULE ORAL
Status: DISCONTINUED | OUTPATIENT
Start: 2022-12-23 | End: 2022-12-24 | Stop reason: HOSPADM

## 2022-12-23 RX ORDER — IOPAMIDOL 755 MG/ML
500 INJECTION, SOLUTION INTRAVASCULAR ONCE
Status: COMPLETED | OUTPATIENT
Start: 2022-12-23 | End: 2022-12-23

## 2022-12-23 RX ORDER — DIPHENHYDRAMINE HCL 25 MG
25 CAPSULE ORAL
Status: DISCONTINUED | OUTPATIENT
Start: 2022-12-23 | End: 2022-12-24 | Stop reason: HOSPADM

## 2022-12-23 RX ADMIN — IOPAMIDOL 100 ML: 755 INJECTION, SOLUTION INTRAVENOUS at 11:28

## 2022-12-24 ENCOUNTER — HEALTH MAINTENANCE LETTER (OUTPATIENT)
Age: 87
End: 2022-12-24

## 2022-12-26 ENCOUNTER — HOSPITAL ENCOUNTER (OUTPATIENT)
Dept: WOUND CARE | Facility: CLINIC | Age: 87
Discharge: HOME | End: 2022-12-26
Attending: SURGERY | Admitting: SURGERY
Payer: MEDICARE

## 2022-12-26 ENCOUNTER — MEDICAL CORRESPONDENCE (OUTPATIENT)
Dept: HEALTH INFORMATION MANAGEMENT | Facility: CLINIC | Age: 87
End: 2022-12-26

## 2022-12-26 VITALS — DIASTOLIC BLOOD PRESSURE: 72 MMHG | TEMPERATURE: 98 F | SYSTOLIC BLOOD PRESSURE: 131 MMHG | HEART RATE: 95 BPM

## 2022-12-26 DIAGNOSIS — L97.522 CHRONIC TOE ULCER, LEFT, WITH FAT LAYER EXPOSED (H): Primary | ICD-10-CM

## 2022-12-26 PROCEDURE — 11042 DBRDMT SUBQ TIS 1ST 20SQCM/<: CPT | Performed by: SURGERY

## 2022-12-26 NOTE — PROGRESS NOTES
[facility] [place] Progress Note    Subject: Tiffanie Merlos and her daughter Sarah returns to see us today for chronic nonhealing left plantar great toe ulcer.  No evidence of infection, osteomyelitis, arterial insufficiency or swelling.  They have been using Vashe soaks followed by thin layer PleuroGel and endoform antimicrobial and noticed a gradual improvement.  No discomfort.  Well.  She does have mild peripheral neuropathy.    On her last visit she had bumped her left proximal anterior lateral calf with a large skin tear that was debrided.  Treated in similar fashion and this is completely healed.  Still using a Mepilex foam protective dressing.  On the tip of the right great toe she has developed dry skin and a callus but no ulcer presently    She lives in [place] but has been staying with her daughter who works at the [employer] preop surgery.    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  Exam:  /72 (BP Location: Left arm, Patient Position: Chair)   Pulse 95   Temp 98  F (36.7  C) (Temporal)   Wound (used by [facility] only) 11/11/22 1014 Left 1 toe neuropathic ulcer (Active)   Thickness/Stage full thickness 12/26/22 0807   Base pink;slough 12/26/22 0807   Periwound intact 12/26/22 0807   Periwound Temperature warm 12/26/22 0807   Periwound Skin Turgor soft 12/26/22 0807   Edges open 12/26/22 0807   Length (cm) 1.3  12/26/22 0807   Width (cm) 1.7 12/26/22 0807   Depth (cm) 0.2 12/26/22 0807   Wound (cm^2) 2.21 cm^2 12/26/22 0807   Wound Volume (cm^3) 0.44 cm^3 12/26/22 0807   Wound healing % 81.96 12/26/22 0807   Drainage Characteristics/Odor serosanguineous 12/26/22 0807   Drainage Amount moderate 12/26/22 0807   Care, Wound debrided 12/26/22 0807       [facility] 11/11/22 1019 Right 1 toe neuropathic ulcer (Active)   Thickness/Stage partial thickness 12/05/22 0812   Base dry;scab 12/26/22 0807   Periwound intact 12/05/22 0812   Periwound Temperature warm 12/05/22 0812   Periwound Skin Turgor soft 12/05/22 0812   Edges callused 12/26/22 0807   Length (cm) 0 12/26/22 0807   Width (cm) 0 12/26/22 0807   Depth (cm) 0 12/26/22 0807   Wound (cm^2) 0 cm^2 12/26/22 0807   Wound Volume (cm^3) 0 cm^3 12/26/22 0807   Wound healing % 100 12/26/22 0807   Drainage Characteristics/Odor serosanguineous 12/05/22 0812   Drainage Amount none 12/05/22 0812       [facility] 11/21/22 0749 Right 2 toe (Active)   Thickness/Stage full thickness 12/05/22 0812   Base closed/resurfaced 12/26/22 0807   Length (cm) 0 12/26/22 0807   Width (cm) 0 12/26/22 0807   Depth (cm) 0 12/26/22 0807   Wound (cm^2) 0 cm^2 12/26/22 0807   Wound Volume (cm^3) 0 cm^3 12/26/22 0807   Wound healing % 100 12/26/22 0807   Drainage Characteristics/Odor serosanguineous 12/05/22 0812   Drainage Amount moderate 12/05/22 0812       Wound (used by [facility] only) 12/05/22 0811 Left lower;lateral leg (Active)   Thickness/Stage full thickness 12/05/22 0812   Base closed/resurfaced 12/26/22 0807   Length (cm) 0 12/26/22 0807   Width (cm) 0 12/26/22 0807   Depth (cm) 0 12/26/22 0807   Wound (cm^2) 0 cm^2 12/26/22 0807   Wound Volume (cm^3) 0 cm^3 12/26/22 0807   Wound healing % 100 12/26/22 0807   Drainage Characteristics/Odor serosanguineous 12/05/22 0812   Drainage Amount moderate 12/05/22 0812     Alert and appropriate.  Very comfortable.  No swelling  or cellulitis.  Healed left proximal anterior lateral calf ulcer.  Skin is not quite mature.  Callus with no ulcer over the tip of the right great toe  Plantar aspect of the left great toe has a minimal amount of bioburden.  No undermining.  No cellulitis.  Granulation tissue looks healthy.    Procedure:   Patient was determined to be capable of making their own medical decisions and informed consent was obtained. Topical anesthetic of 4% lidocaine was applied, debridement was performed using a #15 blade down to and including subcutaneous tissue on the left great toe.  Some crosshatching performed with good bleeding.  Bleeding controlled with light pressure.  Debrided area approximately 2.5 cm      Remove the dry skin/callus of the tip of the right great toe with no ulcer.  Patient tolerated procedure well.    Impression: #1.  Slowly improving chronic left plantar great toe ulcer.  It is appropriate footwear to offload.  Continue with Vashe soaks followed by a thin layer of PleuroGel and endoform antimicrobial.  Discussed the possibility of eventually using the 3 C patch growth factors--however, not yet approved for our clinic.  Her daughter has some experience with platelet growth factors with hair restoration that she works with another clinic.  I brought this up to the very slow healing the patient is experiencing this could hasten healing since this is an area that skin grafting would be more difficult.     #2.  Healed traumatic left calf ulcer.  Continue with Mepilex foam for protective dressing as the skin matures.  No specific treatment otherwise except for skin lotion.     #3.  Dry skin and callus over tip of right great toe.  No pressure issues from her shoes.  Debrided today.  Continue with lotion.    Plan: We will dress the wounds with above..  Patient will return to the clinic in 2 weeks time      Hakeem Mccabe MD on 12/26/2022 at 8:15 AM          Dictated using Dragon voice recognition software  which may result in transcription errors        Further instructions from your care team       Tiffanie NENA Merlos      1935    A DME order was not completed because the supplies are ordered by home care or at a care facility  supplies ordered by home health  [facility] home care fax [phone] to change 3 times weekly and daughter Sarah to change the other days    Vein Formula 1000mg. Take one (1) 500mg capsule twice daily. Order from  www.[employer] or [employer] or call [phone]. (The 1000mg is two (2)  500mg capsules)    A diet high in protein is important for wound healing, we recommend getting 90 grams  of protein per day. Taking protein shakes or bars are a good way to get extra protein in  your diet.    Wound Dressing Change:  LEFT great toe  -Cleanse with mild unscented soap and water  -Apply Vashe-moistened gauze, let sit for 10 minutes, remove gauze (do not rinse)  -Moisturize skin on legs with high quality, hypoallergenic moisturizer  -Apply small amount Plurogel to 1/2 piece of 2x2 Endoform Antimicrobial (cut-to-fit size of wound)  -Cover with 2x2 gauze and cover with 2 x 2 3/4 inch Medipore+Pad gauze dressing  -Then apply purple Stripe EdemaWear from toes to knee.  Change daily    EdemaWear should be worn 24/7 unless bathing/showering or changing the dressing.  You will wash and reuse the EdemaWear. DO NOT CUT THE EDEMAWEAR. IF IT IS TOO  LONG THEN CUFF THE EDEMAWEAR (the EdemaWear can shrink length wise with  washing).    To Order more plurogel: shop.[employer] or call [phone] to place an order  for 0.7 oz tube  Use discount code PLUROHEALS (all caps) at checkout in the discount code section to  decrease price to $55     Hakeem Mccabe M.D. December 26, 2022    Call us at [phone] if you have any questions about your wounds, have redness or swelling around your wound, have a fever of 101 or greater or if you have any other problems or concerns. We answer the phone Monday  through Friday 8 am to 4 pm, please leave a message as we check the voicemail frequently throughout the day.     If you had a positive experience please indicate that on your patient satisfaction survey form that [facility] will be sending you.    It was a pleasure meeting with you today.  Thank you for allowing me and my team the privilege of caring for you today.  YOU are the reason we are here, and I truly hope we provided you with the excellent service you deserve.  Please let us know if there is anything else we can do for you so that we can be sure you are leaving completely satisfied with your care experience.      If you have any billing related questions please call the [facility] Business office at [phone]. The clinic staff does not handle billing related matters.    If you are scheduled to have a follow up appointment, you will receive a reminder call the day before your visit. On the appointment day please arrive 15 minutes prior to your appointment time. If you are unable to keep that appointment, please call the clinic to cancel or reschedule. If you are more than 10 minutes late or greater for your appointment, the clinic policy is that you may be asked to reschedule.

## 2022-12-26 NOTE — DISCHARGE INSTRUCTIONS
Tiffanie Merlos      1935    A DME order was not completed because the supplies are ordered by home care or at a care facility  supplies ordered by home health  [facility] home care fax [phone] to change 3 times weekly and daughter Sarah to change the other days    Vein Formula 1000mg. Take one (1) 500mg capsule twice daily. Order from  www."[employer] or [employer] or call [phone]. (The 1000mg is two (2)  500mg capsules)    A diet high in protein is important for wound healing, we recommend getting 90 grams  of protein per day. Taking protein shakes or bars are a good way to get extra protein in  your diet.    Wound Dressing Change:  LEFT great toe  -Cleanse with mild unscented soap and water  -Apply Vashe-moistened gauze, let sit for 10 minutes, remove gauze (do not rinse)  -Moisturize skin on legs with high quality, hypoallergenic moisturizer  -Apply small amount Plurogel to 1/2 piece of 2x2 Endoform Antimicrobial (cut-to-fit size of wound)  -Cover with 2x2 gauze and cover with 2 x 2 3/4 inch Medipore+Pad gauze dressing  -Then apply purple Stripe EdemaWear from toes to knee.  Change daily    EdemaWear should be worn 24/7 unless bathing/showering or changing the dressing.  You will wash and reuse the EdemaWear. DO NOT CUT THE EDEMAWEAR. IF IT IS TOO  LONG THEN CUFF THE EDEMAWEAR (the EdemaWear can shrink length wise with  washing).    To Order more plurogel: shop.[employer] or call [phone] to place an order  for 0.7 oz tube  Use discount code PLUROHEALS (all caps) at checkout in the discount code section to  decrease price to $55     Hakeem Mccabe M.D. December 26, 2022    Call us at [phone] if you have any questions about your wounds, have redness or swelling around your wound, have a fever of 101 or greater or if you have any other problems or concerns. We answer the phone Monday through Friday 8 am to 4 pm, please leave a message as we check the voicemail frequently throughout  the day.     If you had a positive experience please indicate that on your patient satisfaction survey form that [facility] will be sending you.    It was a pleasure meeting with you today.  Thank you for allowing me and my team the privilege of caring for you today.  YOU are the reason we are here, and I truly hope we provided you with the excellent service you deserve.  Please let us know if there is anything else we can do for you so that we can be sure you are leaving completely satisfied with your care experience.      If you have any billing related questions please call the [facility] Business office at [phone]. The clinic staff does not handle billing related matters.    If you are scheduled to have a follow up appointment, you will receive a reminder call the day before your visit. On the appointment day please arrive 15 minutes prior to your appointment time. If you are unable to keep that appointment, please call the clinic to cancel or reschedule. If you are more than 10 minutes late or greater for your appointment, the clinic policy is that you may be asked to reschedule.

## 2022-12-27 ENCOUNTER — OFFICE VISIT (OUTPATIENT)
Dept: CARDIOLOGY | Facility: CLINIC | Age: 87
End: 2022-12-27
Attending: INTERNAL MEDICINE
Payer: MEDICARE

## 2022-12-27 VITALS
HEIGHT: 65 IN | BODY MASS INDEX: 32.65 KG/M2 | HEART RATE: 100 BPM | DIASTOLIC BLOOD PRESSURE: 82 MMHG | SYSTOLIC BLOOD PRESSURE: 123 MMHG | WEIGHT: 196 LBS

## 2022-12-27 DIAGNOSIS — I48.19 PERSISTENT ATRIAL FIBRILLATION (H): ICD-10-CM

## 2022-12-27 DIAGNOSIS — Z95.818 PRESENCE OF LEFT ATRIAL APPENDAGE CLOSURE DEVICE: ICD-10-CM

## 2022-12-27 PROCEDURE — 99204 OFFICE O/P NEW MOD 45 MIN: CPT | Performed by: INTERNAL MEDICINE

## 2022-12-27 NOTE — LETTER
12/27/2022    Fahad Sanders MD  [address] [address]    RE: Tiffanie Merlos       Dear Colleague,     I had the pleasure of seeing Tiffanie Merlos in the [facility].      Cardiology Consultation       Assessment & Plan     1.  History of GI bleed unable to take anticoagulation, has bled score of 3 with a HMO6VY5-RNDy score of 4.  2.  Status post watchman device in [place] 24 mm  3.  Post watchman CTA with minimal residual leak as noted below  4.  Chronic atrial fibrillation  5.  Hypertension  6.  Chronic kidney disease  7.  Lower extremity edema and wound, followed by vascular      Recommendations    1.  Chronic atrial fibrillation, unable to take anticoagulation status post watchman.  Reviewed her CTA post procedure we will discontinue her aspirin and she was to take Plavix as monotherapy.  2.  Lower extremity wound: Follow-up with vascular surgery as scheduled  3.  Reviewed her outside echocardiograms and they are stable with preserved LV systolic function  4.  Patient likely will return to [place] following healing of her lower extremity wound.  We have made a 6-month follow-up visit which patient can cancel if she is no longer living in the [facility].    Thank you kindly for consult        Sirisha Carranza MD, MD      HPI:    Patient is an 87-year-old woman who is establishing local cardiac care.  She previously had lived in [place].  In the past it was noted due to her chronic atrial fibrillation that she was unable to tolerate anticoagulation due to GI bleed.  However second 2022 she underwent watchman procedure with a flex device 24 mm.  Subsequent CTA performed in the [employer] system demonstrates a 4 mm residual leak otherwise the device appears to be well-seated.  She is currently on aspirin and Plavix.  Additional comorbidities are listed above and she is establishing locally with our cardiology team.  In addition to the  above-mentioned past medical history she is also followed by vascular due to a chronic nonhealing left great toe ulcer.          CTA:    1.The watchman device appears well seated in the left atrial appendage  with evidence of mild raheem device leak (4.4 mm) with resultant partial  contrast enhancement in the watchman device.  2. Two right-sided and two left-sided pulmonary veins, all draining  into left atrium.  3.  Coronary artery calcifications noted.              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   No past medical history on file.    Past Surgical History   I have reviewed this patient's surgical history and updated it with pertinent information if needed.  No past surgical history on file.    Prior to Admission Medications   Cannot display prior to admission medications because the patient has not been admitted in this contact.     [unfilled]  [unfilled]  Allergies   Allergies   Allergen Reactions     Ibuprofen Rash     ITCHY rash!  ITCHY rash!       Meperidine Nausea and Vomiting     Vomit x 3 days.  Vomit x 3 days.       Oxybutynin Rash     RASH from PATCH  RASH from PATCH       Pregabalin Swelling       Social History        Family History   No family history on file.    Review of Systems   The comprehensive 10 point Review of Systems is negative  other than noted in the HPI or here.     Physical Exam   Vital Signs with Ranges     Wt Readings from Last 4 Encounters:   11/11/22 85.5 kg (188 lb 8 oz)     [unfilled]      Vitals: There were no vitals taken for this visit.     GENERAL: Healthy, alert and no distress  EYES: Eyes grossly normal to inspection.  No discharge or erythema, or obvious scleral/conjunctival abnormalities.  RESP: No audible wheeze, cough, or visible cyanosis.  No visible retractions or increased work of breathing.    CV;  Irregular  Lungs: CTA   Abdomen: + BS NT /ND  SKIN: Visible skin clear. No significant rash, abnormal pigmentation or lesions.  NEURO: Cranial nerves grossly intact.  Mentation and speech appropriate for age.  PSYCH: Mentation appears normal, affect normal/bright, judgement and insight intact, normal speech and appearance well-groomed.    Thank you for allowing me to participate in the care of your patient.      Sincerely,     Sirisha Carranza MD     [facility]  cc:   Sirisha Carranza MD  [address] [facility] [address] [place] [id number]

## 2022-12-27 NOTE — PROGRESS NOTES
Cardiology Consultation       Assessment & Plan     1.  History of GI bleed unable to take anticoagulation, has bled score of 3 with a [id number]-NJBt score of 4.  2.  Status post watchman device in [place] 24 mm  3.  Post watchman CTA with minimal residual leak as noted below  4.  Chronic atrial fibrillation  5.  Hypertension  6.  Chronic kidney disease  7.  Lower extremity edema and wound, followed by vascular      Recommendations    1.  Chronic atrial fibrillation, unable to take anticoagulation status post watchman.  Reviewed her CTA post procedure we will discontinue her aspirin and she was to take Plavix as monotherapy.  2.  Lower extremity wound: Follow-up with vascular surgery as scheduled  3.  Reviewed her outside echocardiograms and they are stable with preserved LV systolic function  4.  Patient likely will return to [place] following healing of her lower extremity wound.  We have made a 6-month follow-up visit which patient can cancel if she is no longer living in the [facility].    Thank you kindly for consult        Sirisha Carranza MD, MD      HPI:    Patient is an 87-year-old woman who is establishing local cardiac care.  She previously had lived in [place].  In the past it was noted due to her chronic atrial fibrillation that she was unable to tolerate anticoagulation due to GI bleed.  However second 2022 she underwent watchman procedure with a flex device 24 mm.  Subsequent CTA performed in the [employer] system demonstrates a 4 mm residual leak otherwise the device appears to be well-seated.  She is currently on aspirin and Plavix.  Additional comorbidities are listed above and she is establishing locally with our cardiology team.  In addition to the above-mentioned past medical history she is also followed by vascular due to a chronic nonhealing left great toe ulcer.          CTA:    1.The watchman device appears well seated in the left atrial appendage  with evidence of mild raheem  device leak (4.4 mm) with resultant partial  contrast enhancement in the watchman device.  2. Two right-sided and two left-sided pulmonary veins, all draining  into left atrium.  3.  Coronary artery calcifications noted.              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   No past medical history on file.    Past Surgical History   I have reviewed this patient's surgical history and updated it with pertinent information if needed.  No past surgical history on file.    Prior to Admission Medications   Cannot display prior to admission medications because the patient has not been admitted in this contact.     [unfilled]  [unfilled]  Allergies   Allergies   Allergen Reactions     Ibuprofen Rash     ITCHY rash!  ITCHY rash!       Meperidine Nausea and Vomiting     Vomit x 3 days.  Vomit x 3 days.       Oxybutynin Rash     RASH from PATCH  RASH from PATCH       Pregabalin Swelling       Social History        Family History   No family history on file.    Review of Systems   The comprehensive 10 point Review of Systems is negative other than noted in the HPI or here.     Physical Exam   Vital Signs with Ranges     Wt Readings from Last 4 Encounters:   11/11/22 85.5 kg (188 lb 8 oz)     [unfilled]      Vitals: There were no vitals taken for this visit.      GENERAL: Healthy, alert and no distress  EYES: Eyes grossly normal to inspection.  No discharge or erythema, or obvious scleral/conjunctival abnormalities.  RESP: No audible wheeze, cough, or visible cyanosis.  No visible retractions or increased work of breathing.    CV;  Irregular  Lungs: CTA   Abdomen: + BS NT /ND  SKIN: Visible skin clear. No significant rash, abnormal pigmentation or lesions.  NEURO: Cranial nerves grossly intact.  Mentation and speech appropriate for age.  PSYCH: Mentation appears normal, affect normal/bright, judgement and insight intact, normal speech and appearance well-groomed.

## 2023-01-09 ENCOUNTER — HOSPITAL ENCOUNTER (OUTPATIENT)
Dept: WOUND CARE | Facility: CLINIC | Age: 88
Discharge: HOME | End: 2023-01-09
Attending: SURGERY | Admitting: SURGERY
Payer: MEDICARE

## 2023-01-09 ENCOUNTER — MEDICAL CORRESPONDENCE (OUTPATIENT)
Dept: HEALTH INFORMATION MANAGEMENT | Facility: CLINIC | Age: 88
End: 2023-01-09

## 2023-01-09 VITALS
SYSTOLIC BLOOD PRESSURE: 131 MMHG | RESPIRATION RATE: 18 BRPM | HEART RATE: 94 BPM | TEMPERATURE: 97 F | DIASTOLIC BLOOD PRESSURE: 67 MMHG

## 2023-01-09 DIAGNOSIS — L97.511 SKIN ULCER OF TOE OF RIGHT FOOT, LIMITED TO BREAKDOWN OF SKIN (H): ICD-10-CM

## 2023-01-09 DIAGNOSIS — L97.522 CHRONIC TOE ULCER, LEFT, WITH FAT LAYER EXPOSED (H): Primary | ICD-10-CM

## 2023-01-09 PROCEDURE — 11042 DBRDMT SUBQ TIS 1ST 20SQCM/<: CPT | Performed by: SURGERY

## 2023-01-09 NOTE — DISCHARGE INSTRUCTIONS
Tiffanie Merlos      1935  A DME order was not completed because the supplies are ordered by home care or at a care facility  [facility] fax [phone] to change 3 times weekly and daughter Sarah to change the other days    Vein Formula 1000mg. Take one (1) 500mg capsule twice daily. Order from www.[employer] or [employer] or call [phone]. (The 1000mg is two (2) 500mg capsules)  A diet high in prote in is important for wound healing, we recommend getting 90 grams of protein per day. Taking protein shakes or bars are a good way to get extra protein in your diet.    Wound Dressing Change: LEFT great toe  -Cleanse with mild unscented soap and water  -Apply Vashe-moistened gauze, let sit for 10 minutes, remove gauze (do not rinse)  -Moisturize skin on legs with high quality, hypoallergenic moisturizer  -Apply Collagen piece like Endoform AM 2x2 1/4 piece cut and cover wound  -Cover with 2x2 gauze and cover with 2 x 2 3/4 inch Medipore+Pad gauze dressing  -Then apply purple Stripe EdemaWear from toes to knee.  Change daily     EdemaWear should be worn 24/7 unless bathing/showering or changing the dressing. You will wash and reuse the EdemaWear. DO NOT CUT THE EDEMAWEAR. IF IT IS TOO LONG THEN CUFF THE EDEMAWEAR (the EdemaWear can shrink length wise with washing).     Hakeem Mccabe M.D. January 9, 2023    Call us at [phone] if you have any questions about your wounds, have redness or swelling around your wound, have a fever of 101 or greater or if you have any other problems or concerns. We answer the phone Monday through Friday 8 am to 4 pm, please leave a message as we check the voicemail frequently throughout the day.     If you had a positive experience please indicate that on your patient satisfaction survey form that [facility] will be sending you.  It was a pleasure meeting with you today.  Thank you for allowing me and my team the privilege of caring for you today.  YOU  are the reason we are here, and I truly hope we provided you with the excellent service you deserve.  Please let us know if there is anything else we can do for you so that we can be sure you are leaving completely satisfied with your care experience.      If you have any billing related questions please call the [facility] Business office at [phone]. The clinic staff does not handle billing related matters.  If you are scheduled to have a follow up appointment, you will receive a reminder call the day before your visit. On the appointment day please arrive 15 minutes prior to your appointment time. If you are unable to keep that appointment, please call the clinic to cancel or reschedule. If you are more than 10 minutes late or greater for your appointment, the clinic policy is that you may be asked to reschedule.

## 2023-01-09 NOTE — PROGRESS NOTES
[facility] Progress Note    Subject: Tiffanie Merlos returns with her granddaughter for follow-up of her plantar left great toe ulcer.  She has been using PleuroGel and protective dressing and offloading with orthotics.  No complaints at all.    She did have a traumatic injury to her left proximal anterior lateral calf which is completely epithelialized and looks fine.    Also issues with an irritation of the tip of the right great toe with no ulcer but the callus and some minor concerns about hammertoe issues on the right that have been resolved with deeper shoes.    She lives in [facility] [facility] but with these issues she has been staying with her daughter who is a PACU nurse at [employer].    Nutrition and diet have been excellent.    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  Exam:  /67 (BP Location: Left arm, Patient Position: Sitting, Cuff Size: Adult Regular)   Pulse 94   Temp 97  F (36.1  C) (Temporal)   Resp 18   Wound (used by OP [facility] only) 11/11/22 1014 Left 1 toe neuropathic ulcer (Active)   Thickness/Stage full thickness 01/09/23 0756   Base pink;slough 01/09/23 0756   Periwound intact 01/09/23 0756   Periwound Temperature warm 01/09/23 0756   Periwound Skin Turgor soft 01/09/23 0756   Edges open 01/09/23 0756   Length (cm) 1.2 01/09/23 0756   Width (cm) 1.7 01/09/23 0756   Depth (cm) 0.2 01/09/23  0756   Wound (cm^2) 2.04 cm^2 01/09/23 0756   Wound Volume (cm^3) 0.41 cm^3 01/09/23 0756   Wound healing % 83.35 01/09/23 0756   Drainage Characteristics/Odor serosanguineous 01/09/23 0756   Drainage Amount moderate 01/09/23 0756   Care, Wound debrided 01/09/23 0756     Alert and appropriate.  Very comfortable.  Ulceration of left great toe has a mild amount of callus.  Mild bioburden.  No undermining.  Base is somewhat dry but very healthy.  No deep structures involved.    Slight callus over tip of right great toe but no ulcer.  Irritated areas over the right dorsal second and fourth toes have a slight scab but no ulcer.  He is improved.    Procedure:   Patient was determined to be capable of making their own medical decisions and informed consent was obtained. Topical anesthetic of 4% lidocaine was applied, debridement was performed using a #15 blade down to and including subcutaneous tissue.  All callus removed.  Skin edges slightly debrided 0.1 cm..  Bioburden removed and the base crosshatched with very good bleeding and healthy tissue.  Bleeding controlled with light pressure. Patient tolerated procedure well.    Patient did have longer toenails.  These were trimmed today with a robust  that they do not have available at home.  No charge.     Impression: #1.  Very slow progress on left plantar great toe.  We will discontinue the PleuroGel and go to endoform antimicrobial moistened with a small amount of saline and a protective bandage to be changed daily.  This is discussed with the patient and her granddaughter.  Continue with offloading orthotics.     If we notice no improvement in the next visit would again consider split-thickness skin grafting and this was discussed with the patient and her granddaughter.  With a very small surface area this could easily be performed under local anesthetic as an outpatient procedure.  This may allow for hastening in the wound healing.    #2.  I discussed issues  with right toes to avoid any pressure    Plan: We will dress the wounds with  moistened endoform antimicrobial daily.  Patient will return to the clinic in 2 and weeks time      Hakeem Mccabe MD on 1/9/2023 at 8:06 AM    Dictated using Dragon voice recognition software which may result in transcription errors        Further instructions from your care team       Tiffanie Merlos      1935  A DME order was not completed because the supplies are ordered by home care or at a care facility  [facility] fax [phone] to change 3 times weekly and daughter Sarah to change the other days    Vein Formula 1000mg. Take one (1) 500mg capsule twice daily. Order from www.[employer] or [employer] or call [phone]. (The 1000mg is two (2) 500mg capsules)  A diet high in prote in is important for wound healing, we recommend getting 90 grams of protein per day. Taking protein shakes or bars are a good way to get extra protein in your diet.    Wound Dressing Change: LEFT great toe  -Cleanse with mild unscented soap and water  -Apply Vashe-moistened gauze, let sit for 10 minutes, remove gauze (do not rinse)  -Moisturize skin on legs with high quality, hypoallergenic moisturizer  -Apply Collagen piece like Endoform AM 2x2 1/4 piece cut and cover wound  -Cover with 2x2 gauze and cover with 2 x 2 3/4 inch Medipore+Pad gauze dressing  -Then apply purple Stripe EdemaWear from toes to knee.  Change daily     EdemaWear should be worn 24/7 unless bathing/showering or changing the dressing. You will wash and reuse the EdemaWear. DO NOT CUT THE EDEMAWEAR. IF IT IS TOO LONG THEN CUFF THE EDEMAWEAR (the EdemaWear can shrink length wise with washing).     Hakeem Mccabe M.D. January 9, 2023    Call us at [phone] if you have any questions about your wounds, have redness or swelling around your wound, have a fever of 101 or greater or if you have any other problems or concerns. We answer the phone Monday  through Friday 8 am to 4 pm, please leave a message as we check the voicemail frequently throughout the day.     If you had a positive experience please indicate that on your patient satisfaction survey form that [facility] will be sending you.  It was a pleasure meeting with you today.  Thank you for allowing me and my team the privilege of caring for you today.  YOU are the reason we are here, and I truly hope we provided you with the excellent service you deserve.  Please let us know if there is anything else we can do for you so that we can be sure you are leaving completely satisfied with your care experience.      If you have any billing related questions please call the [facility] Business office at [phone]. The clinic staff does not handle billing related matters.  If you are scheduled to have a follow up appointment, you will receive a reminder call the day before your visit. On the appointment day please arrive 15 minutes prior to your appointment time. If you are unable to keep that appointment, please call the clinic to cancel or reschedule. If you are more than 10 minutes late or greater for your appointment, the clinic policy is that you may be asked to reschedule.

## 2023-01-20 ENCOUNTER — HOSPITAL ENCOUNTER (OUTPATIENT)
Dept: WOUND CARE | Facility: CLINIC | Age: 88
Discharge: HOME | End: 2023-01-20
Attending: PODIATRIST | Admitting: PODIATRIST
Payer: MEDICARE

## 2023-01-20 VITALS — DIASTOLIC BLOOD PRESSURE: 67 MMHG | SYSTOLIC BLOOD PRESSURE: 126 MMHG | TEMPERATURE: 97.6 F | HEART RATE: 98 BPM

## 2023-01-20 DIAGNOSIS — L97.522 CHRONIC TOE ULCER, LEFT, WITH FAT LAYER EXPOSED (H): Primary | ICD-10-CM

## 2023-01-20 DIAGNOSIS — L97.511 SKIN ULCER OF TOE OF RIGHT FOOT, LIMITED TO BREAKDOWN OF SKIN (H): ICD-10-CM

## 2023-01-20 PROCEDURE — 28008 INCISION OF FOOT FASCIA: CPT | Mod: LT | Performed by: PODIATRIST

## 2023-01-20 PROCEDURE — 28008 INCISION OF FOOT FASCIA: CPT | Performed by: PODIATRIST

## 2023-01-20 PROCEDURE — 99203 OFFICE O/P NEW LOW 30 MIN: CPT | Mod: 25 | Performed by: PODIATRIST

## 2023-01-20 PROCEDURE — 11042 DBRDMT SUBQ TIS 1ST 20SQCM/<: CPT | Performed by: PODIATRIST

## 2023-01-20 PROCEDURE — 11042 DBRDMT SUBQ TIS 1ST 20SQCM/<: CPT | Mod: 51 | Performed by: PODIATRIST

## 2023-01-20 PROCEDURE — G0463 HOSPITAL OUTPT CLINIC VISIT: HCPCS | Mod: 25 | Performed by: PODIATRIST

## 2023-01-20 PROCEDURE — 28010 INCISION OF TOE TENDON: CPT | Performed by: PODIATRIST

## 2023-01-20 NOTE — DISCHARGE INSTRUCTIONS
Tiffanie Merlos      1935    A DME order for DH2 shoe has been placed to [facility]. If there are any issues with your order including not receiving the order please call [facility] at [phone] option 3. They can also provide a tracking number for you if you had supplies shipped to you.    Patient was with [facility] fax [phone] to change 3 times weekly and daughter Sarah to  change the other days  Vein Formula 1000mg. Take one (1) 500mg capsule twice daily. Order from  www.[employer] or [employer] or call [phone]. (The 1000mg is two (2)  500mg capsules)  A diet high in prote in is important for wound healing, we recommend getting 90  grams of protein per day. Taking protein shakes or bars are a good way to get extra  protein in your diet.    Wound Dressing Change:   LEFT great toe  -Cleanse with mild unscented soap and water  -Apply Vashe-moistened gauze, let sit for 10 minutes, remove gauze (do not rinse)  -Moisturize skin on legs with high quality, hypoallergenic moisturizer  -Apply 1/4 of 2x2 Endoform AM to the wound  -Cover with 2x2 gauze and cover with 2 x 2  3/4 inch Medipore+Pad gauze dressing  -Then apply purple Stripe EdemaWear from toes to knee.  Change daily    Left plantar foot  Needle insertion location   Covered today with Medipore+Pad dressing.   Leave it on for two days and do not get it wet or moist     EdemaWear should be worn 24/7 unless bathing/showering or changing the dressing.  You will wash and reuse the EdemaWear. DO NOT CUT THE EDEMAWEAR. IF IT IS TOO  LONG THEN CUFF THE EDEMAWEAR (the EdemaWear can shrink length wise with  washing).    Please follow up in [facility] in two weeks     CRISTIAN Pina.P.IRIS. January 20, 2023    Call us at [phone] if you have any questions about your wounds, have redness or swelling around your wound, have a fever of 101 or greater or if you have any other problems or concerns. We  answer the phone Monday through Friday 8 am to 4 pm, please leave a message as we check the voicemail frequently throughout the day.     If you had a positive experience please indicate that on your patient satisfaction survey form that [facility] will be sending you.    It was a pleasure meeting with you today.  Thank you for allowing me and my team the privilege of caring for you today.  YOU are the reason we are here, and I truly hope we provided you with the excellent service you deserve.  Please let us know if there is anything else we can do for you so that we can be sure you are leaving completely satisfied with your care experience.      If you have any billing related questions please call the [facility] Business office at [phone]. The clinic staff does not handle billing related matters.    If you are scheduled to have a follow up appointment, you will receive a reminder call the day before your visit. On the appointment day please arrive 15 minutes prior to your appointment time. If you are unable to keep that appointment, please call the clinic to cancel or reschedule. If you are more than 10 minutes late or greater for your appointment, the clinic policy is that you may be asked to reschedule.

## 2023-01-20 NOTE — PROGRESS NOTES
[facility] [place] Progress Note    Subject: Patient was seen at wound center for left foot. She's had an ulcer to the bottom of her hallux since June of last year. States has tried many different therapies without improvement. Denies pain. Has significant neuropathy of unknown cause. States builds a callus to it and drains from time to time. Denies N/F/V/C/D. Presents with her daughter who assists in her wound care.     PMH: No past medical history on file.    Social Hx:   Social History     Socioeconomic History     Marital status:      Spouse name: Not on file     Number of children: Not on file     Years of education: Not on file     Highest education level: Not on file   Occupational History     Not on file   Tobacco Use     Smoking status: Never     Smokeless tobacco: Never   Substance and Sexual Activity     Alcohol use: Yes     Comment: socially     Drug use: Not on file     Sexual activity: Not on file   Other Topics Concern     Not on file   Social History Narrative     Not on file     Social Determinants of Health     Financial Resource Strain: Not on file   Food Insecurity: Not on file   Transportation Needs: Not on file   Physical Activity: Not on file   Stress: Not on file   Social Connections: Not on file   Intimate Partner Violence: Not on file   Housing Stability: Not on file       Surgical Hx: No past surgical history on file.    Allergies:    Allergies   Allergen Reactions     Ibuprofen Rash     ITCHY rash!  ITCHY rash!       Meperidine Nausea and Vomiting     Vomit x 3 days.  Vomit x 3 days.       Oxybutynin Rash     RASH from PATCH  RASH from PATCH       Pregabalin Swelling       Medications:   Current Outpatient Medications   Medication     acetaminophen (TYLENOL) 325 MG tablet     allopurinol (ZYLOPRIM) 100 MG tablet     amoxicillin (AMOXIL) 500 MG capsule     bumetanide (BUMEX) 2 MG tablet     cholecalciferol 50 MCG (2000 UT) tablet     clopidogrel (PLAVIX) 75 MG tablet      diphenhydrAMINE-acetaminophen (TYLENOL PM)  MG tablet     docusate sodium (COLACE) 100 MG capsule     folic acid (FOLVITE) 1 MG tablet     latanoprost (XALATAN) 0.005 % ophthalmic solution     metoprolol succinate ER (TOPROL XL) 100 MG 24 hr tablet     pantoprazole (PROTONIX) 40 MG EC tablet     potassium chloride ER (K-TAB) 20 MEQ CR tablet     Probiotic Product (PROBIOTIC GUMMIES PO)     vitamin B-12 (CYANOCOBALAMIN) 1000 MCG tablet     No current facility-administered medications for this encounter.         Objective:  Vitals:  /67   Pulse 98   Temp 97.6  F (36.4  C) (Temporal)     A1C: None     General:  Patient is alert and orientated.  NAD     Dermatologic: Left hallux ulcer, sub IPJ. Granular wound bed. Periwound callus. No cellulitis or probe to bone. Depth to subcutaneous tissue. Nontender.     .   Wound (used by OP [facility] only) 11/11/22 1014 Left 1 toe neuropathic ulcer (Active)   Thickness/Stage full thickness 01/09/23 0756   Base pink;slough 01/09/23 0756   Periwound intact 01/09/23 0756   Periwound Temperature warm 01/09/23 0756   Periwound Skin Turgor soft 01/09/23 0756   Edges open 01/09/23 0756   Length (cm) 1.2 01/20/23 1451   Width (cm) 1.5 01/20/23 1451   Depth (cm) 0.3 01/20/23 1451   Wound (cm^2) 1.8 cm^2 01/20/23 1451   Wound Volume (cm^3) 0.54 cm^3 01/20/23 1451   Wound healing % 85.31 01/20/23 1451   Drainage Characteristics/Odor serosanguineous 01/20/23 1451   Drainage Amount moderate 01/20/23 1451   Care, Wound debrided 01/09/23 0756          Vascular: DP & PT pulses are intact & regular bilaterally.  No significant edema or varicosities noted.  CFT and skin temperature is normal to both lower extremities.     Neurologic: Lower extremity sensation is intact to light touch.  No evidence of weakness or contracture in the lower extremities.  No evidence of neuropathy.     Musculoskeletal: Patient is ambulatory without assistive device or brace.  No gross ankle deformity noted.  Left foot flatfoot deformity. Noted IPJ stiffness and limited ROM     Assessment:   1. Left hallux ulcer, sub IPJ      Plan:    -Discussed all findings with patient and daughter.   -Recommendation made for plantar medial fasciotomy to offload hallux. Discussed risks and benefits at length and that given length of time and difficulty with healing, would be beneficial. They agree to this. Procedure done as stated below. Well tolerated.   -Plantar dressing to stay on until Sunday. Then can be removed. No further dressing needed.   -Cont endoform to hallux.   -Order placed for surgical shoe to also assist in offloading, rather than current slipper that she wears.   -Further follow up planned in podiatry clinic.   -Ulcer excisionally debrided as discussed below.     Procedure:  After verbal consent, per protocol lidocaine was applied to the wound. Excisional debridement was performed on ulcer.   #15 blade was used to debride ulcer down to and including subcutaneous tissue. Bleeding controlled with light pressure.  No drainage noted.   < 20sq cm debrided.  Dry dressing applied to foot.  Patient tolerated procedure well.    Procedure: After verbal and written consent were obtained, the medial plantar fascia band was anesthesized with 1.5 ml of lidocaine plain. Site was then prepped with betadine and alcohol. Next an 18 gauge needle was used to percutaneous transect the plantar fascia. This was confirmed audibly and by touch. Once all fascia was found to be cut, the site was again cleansed with alcohol and then covered with a sterile dressing which will stay intact for the next 3 days.       Tessie Pineda DPM                Further instructions from your care team       Tiffanie Merlos      1935    A DME order for DH2 shoe has been placed to [facility]. If there are any issues with your order including not receiving the order please call [facility] at [phone] option 3. They can also  provide a tracking number for you if you had supplies shipped to you.    Patient was with [facility] fax [phone] to change 3 times weekly and daughter Sarah to  change the other days  Vein Formula 1000mg. Take one (1) 500mg capsule twice daily. Order from  www.[employer] or [employer] or call [phone]. (The 1000mg is two (2)  500mg capsules)  A diet high in prote in is important for wound healing, we recommend getting 90  grams of protein per day. Taking protein shakes or bars are a good way to get extra  protein in your diet.    Wound Dressing Change:   LEFT great toe  -Cleanse with mild unscented soap and water  -Apply Vashe-moistened gauze, let sit for 10 minutes, remove gauze (do not rinse)  -Moisturize skin on legs with high quality, hypoallergenic moisturizer  -Apply 1/4 of 2x2 Endoform AM to the wound  -Cover with 2x2 gauze and cover with 2 x 2  3/4 inch Medipore+Pad gauze dressing  -Then apply purple Stripe EdemaWear from toes to knee.  Change daily    Left plantar foot  Needle insertion location   Covered today with Medipore+Pad dressing.   Leave it on for two days and do not get it wet or moist     EdemaWear should be worn 24/7 unless bathing/showering or changing the dressing.  You will wash and reuse the EdemaWear. DO NOT CUT THE EDEMAWEAR. IF IT IS TOO  LONG THEN CUFF THE EDEMAWEAR (the EdemaWear can shrink length wise with  washing).    Please follow up in [facility] in two weeks     CORIE PinaPDIEGO. January 20, 2023    Call us at [phone] if you have any questions about your wounds, have redness or swelling around your wound, have a fever of 101 or greater or if you have any other problems or concerns. We answer the phone Monday through Friday 8 am to 4 pm, please leave a message as we check the voicemail frequently throughout the day.     If you had a positive experience please indicate that on your patient satisfaction survey form that [facility]  will be sending you.    It was a pleasure meeting with you today.  Thank you for allowing me and my team the privilege of caring for you today.  YOU are the reason we are here, and I truly hope we provided you with the excellent service you deserve.  Please let us know if there is anything else we can do for you so that we can be sure you are leaving completely satisfied with your care experience.      If you have any billing related questions please call the [facility] Business office at [phone]. The clinic staff does not handle billing related matters.    If you are scheduled to have a follow up appointment, you will receive a reminder call the day before your visit. On the appointment day please arrive 15 minutes prior to your appointment time. If you are unable to keep that appointment, please call the clinic to cancel or reschedule. If you are more than 10 minutes late or greater for your appointment, the clinic policy is that you may be asked to reschedule.

## 2023-01-23 ENCOUNTER — MEDICAL CORRESPONDENCE (OUTPATIENT)
Dept: HEALTH INFORMATION MANAGEMENT | Facility: CLINIC | Age: 88
End: 2023-01-23

## 2023-01-30 ENCOUNTER — MEDICAL CORRESPONDENCE (OUTPATIENT)
Dept: HEALTH INFORMATION MANAGEMENT | Facility: CLINIC | Age: 88
End: 2023-01-30

## 2023-02-06 ENCOUNTER — MEDICAL CORRESPONDENCE (OUTPATIENT)
Dept: HEALTH INFORMATION MANAGEMENT | Facility: CLINIC | Age: 88
End: 2023-02-06
Payer: MEDICARE

## 2023-02-08 ENCOUNTER — OFFICE VISIT (OUTPATIENT)
Dept: PODIATRY | Facility: CLINIC | Age: 88
End: 2023-02-08
Payer: MEDICARE

## 2023-02-08 VITALS — WEIGHT: 196 LBS | SYSTOLIC BLOOD PRESSURE: 112 MMHG | DIASTOLIC BLOOD PRESSURE: 64 MMHG | BODY MASS INDEX: 32.62 KG/M2

## 2023-02-08 DIAGNOSIS — L97.522 CHRONIC FOOT ULCER WITH FAT LAYER EXPOSED, LEFT (H): Primary | ICD-10-CM

## 2023-02-08 PROCEDURE — 11042 DBRDMT SUBQ TIS 1ST 20SQCM/<: CPT | Mod: 58 | Performed by: PODIATRIST

## 2023-02-08 PROCEDURE — 99024 POSTOP FOLLOW-UP VISIT: CPT | Performed by: PODIATRIST

## 2023-02-08 NOTE — LETTER
2/8/2023         RE: Tiffanie Merlos  [address] [place] [id number]        Dear Colleague,    Thank you for referring your patient, Tiffanie Merlos, to the [facility]. Please see a copy of my visit note below.    [facility]     Subject: Patient was seen in follow up for left foot. She was last seen at the wound care center on 1/20. She had a plantar fasciotomy done in office to offload her hallux ulcer. States she doesn't know how it's doing. Denies pain. Denies issue with fasciotomy site. States her daughter says ulcer is getting smaller. Has home health who is assisting in dressing changes. Denies N/F/V/C/D. Presents with her sister in law who assists in her wound care.     PMH: No past medical history on file.    Social Hx:   Social History     Socioeconomic History     Marital status:      Spouse name: Not on file     Number of children: Not on file     Years of education: Not on file     Highest education level: Not on file   Occupational History     Not on file   Tobacco Use     Smoking status: Never     Smokeless tobacco: Never   Substance and Sexual Activity     Alcohol use: Yes     Comment: socially     Drug use: Not on file     Sexual activity: Not on file   Other Topics Concern     Not on file   Social History Narrative     Not on file     Social Determinants of Health     Financial Resource Strain: Not on file   Food Insecurity: Not on file   Transportation Needs: Not on file   Physical Activity: Not on file   Stress: Not on file   Social Connections: Not on file   Intimate Partner Violence: Not on file   Housing Stability: Not on file       Surgical Hx: No past surgical history on file.    Allergies:    Allergies   Allergen Reactions     Ibuprofen Rash     ITCHY rash!  ITCHY rash!       Meperidine Nausea and Vomiting     Vomit x 3 days.  Vomit x 3 days.       Oxybutynin Rash     RASH from PATCH  RASH from PATCH       Pregabalin  Swelling       Medications:   Current Outpatient Medications   Medication     acetaminophen (TYLENOL) 325 MG tablet     allopurinol (ZYLOPRIM) 100 MG tablet     amoxicillin (AMOXIL) 500 MG capsule     bumetanide (BUMEX) 2 MG tablet     cholecalciferol 50 MCG (2000 UT) tablet     clopidogrel (PLAVIX) 75 MG tablet     diphenhydrAMINE-acetaminophen (TYLENOL PM)  MG tablet     docusate sodium (COLACE) 100 MG capsule     folic acid (FOLVITE) 1 MG tablet     latanoprost (XALATAN) 0.005 % ophthalmic solution     metoprolol succinate ER (TOPROL XL) 100 MG 24 hr tablet     pantoprazole (PROTONIX) 40 MG EC tablet     potassium chloride ER (K-TAB) 20 MEQ CR tablet     Probiotic Product (PROBIOTIC GUMMIES PO)     vitamin B-12 (CYANOCOBALAMIN) 1000 MCG tablet     No current facility-administered medications for this visit.         Objective:  Vitals:  /64   Wt 88.9 kg (196 lb)   BMI 32.62 kg/m      A1C: None     General:  Patient is alert and orientated.  NAD     Dermatologic: Left hallux ulcer, sub IPJ. Granular wound bed. Periwound callus. No cellulitis or probe to bone. Depth to subcutaneous tissue. Nontender.  Ulcer measures 7 mm x 14 mm x 1 mm. Significantly smaller than las evaluation. Improved.     Vascular: DP & PT pulses are intact & regular bilaterally.  No significant edema or varicosities noted.  CFT and skin temperature is normal to both lower extremities.     Neurologic: Lower extremity sensation is intact to light touch.  No evidence of weakness or contracture in the lower extremities.  No evidence of neuropathy.     Musculoskeletal: Patient is ambulatory without assistive device or brace.  No gross ankle deformity noted. Left foot flatfoot deformity. Noted IPJ stiffness and limited ROM     Assessment:   1. Left hallux ulcer, sub IPJ --> improving   2. S/p left plantar fasciotomy       Plan:    -Discussed all findings with patient and sister in law   -Ulcer much improved since procedure. Expect  should continue to heal at this point. Excisional debridement of site as stated below.   -No signs of infection. Granular wound bed. Cont current dressing change plan   -Cont WBAT in surgical shoe   -Has follow up at the  center. States [facility] is easier for her to get to. Recommend if she'd like to see me, follow up in [place] in 2 weeks.     Procedure:  After verbal consent, per protocol lidocaine was applied to the wound. Excisional debridement was performed on ulcer.   #15 blade was used to debride ulcer down to and including subcutaneous tissue. Bleeding controlled with light pressure.  No drainage noted.   Debrided site measures 1 cm x 2 cm x .1 cm. Dry dressing applied to foot.  Patient tolerated procedure well.        Tessie Pineda DPM                            Again, thank you for allowing me to participate in the care of your patient.        Sincerely,        Tessie Pineda DPM

## 2023-02-08 NOTE — PATIENT INSTRUCTIONS
Thank you for choosing [facility]!    DR. DE LA ROSA'S CLINIC:  [facility] SCHEDULE SURGERY: [phone]   [address] BILLING QUESTIONS: [phone]   [place] [address] APPOINTMENTS: [phone]   PH: [phone] CONSUMER MARTINEZ LINE:[phone]   FAX: [phone]      Follow up:   -Cont to follow up at the wound care center as scheduled     Next steps:   -Ulcer is improving!   -Cont current dressing plan with home health.   -Cont to keep foot clean and dry.   -Cont to wear surgical shoe while walking        Flu vaccines are now available at all [facility] clinics and retail pharmacies across the [facility]. Appointments are required for clinic locations. To schedule an appointment online, please log into [employer] or create an account if you are a new user. You can also call [phone], or simply walk in at one of the [facility] retail pharmacy locations.

## 2023-02-08 NOTE — PROGRESS NOTES
[facility]     Subject: Patient was seen in follow up for left foot. She was last seen at the wound care center on 1/20. She had a plantar fasciotomy done in office to offload her hallux ulcer. States she doesn't know how it's doing. Denies pain. Denies issue with fasciotomy site. States her daughter says ulcer is getting smaller. Has home health who is assisting in dressing changes. Denies N/F/V/C/D. Presents with her sister in law who assists in her wound care.     PMH: No past medical history on file.    Social Hx:   Social History     Socioeconomic History     Marital status:      Spouse name: Not on file     Number of children: Not on file     Years of education: Not on file     Highest education level: Not on file   Occupational History     Not on file   Tobacco Use     Smoking status: Never     Smokeless tobacco: Never   Substance and Sexual Activity     Alcohol use: Yes     Comment: socially     Drug use: Not on file     Sexual activity: Not on file   Other Topics Concern     Not on file   Social History Narrative     Not on file     Social Determinants of Health     Financial Resource Strain: Not on file   Food Insecurity: Not on file   Transportation Needs: Not on file   Physical Activity: Not on file   Stress: Not on file   Social Connections: Not on file   Intimate Partner Violence: Not on file   Housing Stability: Not on file       Surgical Hx: No past surgical history on file.    Allergies:    Allergies   Allergen Reactions     Ibuprofen Rash     ITCHY rash!  ITCHY rash!       Meperidine Nausea and Vomiting     Vomit x 3 days.  Vomit x 3 days.       Oxybutynin Rash     RASH from PATCH  RASH from PATCH       Pregabalin Swelling       Medications:   Current Outpatient Medications   Medication     acetaminophen (TYLENOL) 325 MG tablet     allopurinol (ZYLOPRIM) 100 MG tablet     amoxicillin (AMOXIL) 500 MG capsule     bumetanide (BUMEX) 2 MG tablet     cholecalciferol 50 MCG (2000  UT) tablet     clopidogrel (PLAVIX) 75 MG tablet     diphenhydrAMINE-acetaminophen (TYLENOL PM)  MG tablet     docusate sodium (COLACE) 100 MG capsule     folic acid (FOLVITE) 1 MG tablet     latanoprost (XALATAN) 0.005 % ophthalmic solution     metoprolol succinate ER (TOPROL XL) 100 MG 24 hr tablet     pantoprazole (PROTONIX) 40 MG EC tablet     potassium chloride ER (K-TAB) 20 MEQ CR tablet     Probiotic Product (PROBIOTIC GUMMIES PO)     vitamin B-12 (CYANOCOBALAMIN) 1000 MCG tablet     No current facility-administered medications for this visit.         Objective:  Vitals:  /64   Wt 88.9 kg (196 lb)   BMI 32.62 kg/m      A1C: None     General:  Patient is alert and orientated.  NAD     Dermatologic: Left hallux ulcer, sub IPJ. Granular wound bed. Periwound callus. No cellulitis or probe to bone. Depth to subcutaneous tissue. Nontender.  Ulcer measures 7 mm x 14 mm x 1 mm. Significantly smaller than las evaluation. Improved.     Vascular: DP & PT pulses are intact & regular bilaterally.  No significant edema or varicosities noted.  CFT and skin temperature is normal to both lower extremities.     Neurologic: Lower extremity sensation is intact to light touch.  No evidence of weakness or contracture in the lower extremities.  No evidence of neuropathy.     Musculoskeletal: Patient is ambulatory without assistive device or brace.  No gross ankle deformity noted. Left foot flatfoot deformity. Noted IPJ stiffness and limited ROM     Assessment:   1. Left hallux ulcer, sub IPJ --> improving   2. S/p left plantar fasciotomy       Plan:    -Discussed all findings with patient and sister in law   -Ulcer much improved since procedure. Expect should continue to heal at this point. Excisional debridement of site as stated below.   -No signs of infection. Granular wound bed. Cont current dressing change plan   -Cont WBAT in surgical shoe   -Has follow up at the  center. States [facility] is easier for her to get  to. Recommend if she'd like to see me, follow up in [place] in 2 weeks.     Procedure:  After verbal consent, per protocol lidocaine was applied to the wound. Excisional debridement was performed on ulcer.   #15 blade was used to debride ulcer down to and including subcutaneous tissue. Bleeding controlled with light pressure.  No drainage noted.   Debrided site measures 1 cm x 2 cm x .1 cm. Dry dressing applied to foot.  Patient tolerated procedure well.        Tessie Pineda DPM

## 2023-02-20 ENCOUNTER — HOSPITAL ENCOUNTER (OUTPATIENT)
Dept: WOUND CARE | Facility: CLINIC | Age: 88
Discharge: HOME | End: 2023-02-20
Attending: SURGERY | Admitting: SURGERY
Payer: MEDICARE

## 2023-02-20 ENCOUNTER — MEDICAL CORRESPONDENCE (OUTPATIENT)
Dept: HEALTH INFORMATION MANAGEMENT | Facility: CLINIC | Age: 88
End: 2023-02-20

## 2023-02-20 VITALS — SYSTOLIC BLOOD PRESSURE: 143 MMHG | HEART RATE: 77 BPM | TEMPERATURE: 97.1 F | DIASTOLIC BLOOD PRESSURE: 72 MMHG

## 2023-02-20 DIAGNOSIS — Z53.9 DIAGNOSIS NOT YET DEFINED: Primary | ICD-10-CM

## 2023-02-20 DIAGNOSIS — L97.522 CHRONIC TOE ULCER, LEFT, WITH FAT LAYER EXPOSED (H): Primary | ICD-10-CM

## 2023-02-20 PROCEDURE — G0179 MD RECERTIFICATION HHA PT: HCPCS | Performed by: SURGERY

## 2023-02-20 PROCEDURE — 11042 DBRDMT SUBQ TIS 1ST 20SQCM/<: CPT | Performed by: SURGERY

## 2023-02-20 RX ORDER — GABAPENTIN 100 MG/1
100 CAPSULE ORAL AT BEDTIME
Qty: 30 CAPSULE | Refills: 3 | Status: SHIPPED | OUTPATIENT
Start: 2023-02-20 | End: 2023-03-22

## 2023-02-20 NOTE — DISCHARGE INSTRUCTIONS
Tiffanie Merlos      1935    A DME order was not completed because the supplies are ordered by home care or at a care facility    Patient was with [facility] fax [phone] to change 3 times weekly and daughter Saarh to change the other days.    Gabapentin   Start taking one 100mg capsule of Gabapentin every night. You can pick this up at [facility].     Vein Formula 1000mg.   Take one (1) 500mg capsule twice daily.   Order from www.[employer] or [employer] or call [phone]. (The 1000mg is two (2) 500mg capsules)    High Protein Diet  A diet high in protein is important for wound healing, we recommend getting 90 grams of protein per day. Taking protein shakes or bars are a good way to get extra protein in your diet.    Wound Dressing Change:  LEFT and RIGHT great toe  -Cleanse with mild unscented soap and water  -Apply Vashe-moistened gauze, let sit for 10 minutes, remove gauze (do not rinse)  -Moisturize skin on legs with high quality, hypoallergenic moisturizer  -Apply 1/4 of 2x2 Endoform AM to the wound  -Cover with 2x2 gauze and cover with 2 x 2 3/4 inch Medipore+Pad gauze dressing  -Then apply purple Stripe EdemaWear from toes to knee.  Change daily    EdemaWear   -Edema Wear should be worn 24/7 unless bathing/showering or changing the dressing.  -You will wash and reuse the EdemaWear.   -DO NOT CUT THE EDEMAWEAR. IF IT IS TOO LONG THEN CUFF THE EDEMAWEAR (the EdemaWear can shrink length wise with washing).     Hakeem Mccabe M.D. February 20, 2023    Call us at [phone] if you have any questions about your wounds, have redness or swelling around your wound, have a fever of 101 or greater or if you have any other problems or concerns. We answer the phone Monday through Friday 8 am to 4 pm, please leave a message as we check the voicemail frequently throughout the day.     If you had a positive experience please indicate that on your patient satisfaction survey form that [facility] will be sending you.    It was a pleasure meeting with you today.  Thank you for allowing me and my team the privilege of caring for you today.  YOU are the reason we are here, and I truly hope we provided you with the excellent service you deserve.  Please let us know if there is anything else we can do for you so that we can be sure you are leaving completely satisfied with your care experience.      If you have any billing related questions please call the [facility] Business office at [phone]. The clinic staff does not handle billing related matters.    If you are scheduled to have a follow up appointment, you will receive a reminder call the day before your visit. On the appointment day please arrive 15 minutes prior to your appointment time. If you are unable to keep that appointment, please call the clinic to cancel or reschedule. If you are more than 10 minutes late or greater for your appointment, the clinic policy is that you may be asked to reschedule.

## 2023-02-20 NOTE — PROGRESS NOTES
[facility] Wound Healing [place] Progress Note    Subject: Tiffanie Merlos returns for follow-up of her chronic left plantar great toe ulcer felt secondary to pressure.  This has been gradually improving with debridements and wound care.  Performing offloading but still issues with pressure on the side.    On 1/20/2022 Dr Pineda performed a plantar medial fasciotomy on her wound care visit to help offload the area.  Placed her into a cast boot over this area was healing.  Follow-up 2/8/2022 noted to be doing very well with significant improvement of the ulcer size.    Had been on gabapentin for short period of time due to idiopathic neuropathy by her neurologist closer to her home.  This was discontinued but now she is noting more burning neuropathy in both of her feet that is preventing her from sleeping at night.  Discussed restarting low-dose neuropathy.    Patient does not have specific orthotics for offloading except for the cast boot which is relatively firm.  Apparently she has been walking around at home (staying with her daughter here in the [facility]) with just her stocking feet which could be giving excessive pressure to the great toe site.    Is a small area with callus over the tip of the right great toe    Patient Active Problem List   Diagnosis     Age-related cataract of right eye     B12 deficiency anemia     Cellulitis of left lower extremity     Dependent edema     Hereditary and idiopathic peripheral neuropathy     Hypertension     Hyponatremia     Idiopathic chronic gout of left foot without tophus     Persistent atrial fibrillation (H)     Other urinary incontinence     Skin ulcer of toe of right foot, limited to breakdown of skin (H)     Stage 3a chronic kidney disease (H)     Gastrointestinal hemorrhage     Chronic toe ulcer, left, 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  Exam:  BP (!) 143/72 (BP Location: Left arm, Patient Position: Sitting, Cuff Size: Adult  Regular)   Pulse 77   Temp 97.1  F (36.2  C) (Temporal)   Wound (used by OP I only) 11/11/22 1014 Left 1 toe neuropathic ulcer (Active)   Thickness/Stage full thickness 01/09/23 0756   Base pink;slough 01/09/23 0756   Periwound intact 01/09/23 0756   Periwound Temperature warm 01/09/23 0756   Periwound Skin Turgor soft 01/09/23 0756   Edges open 01/09/23 0756   Length (cm) 1.2 01/20/23 1451   Width (cm) 1.5 01/20/23 1451   Depth (cm) 0.3 01/20/23 1451   Wound (cm^2) 1.8 cm^2 01/20/23 1451   Wound Volume (cm^3) 0.54 cm^3 01/20/23 1451   Wound healing % 85.31 01/20/23 1451   Drainage Characteristics/Odor serosanguineous 01/20/23 1451   Drainage Amount moderate 01/20/23 1451   Care, Wound debrided 01/09/23 0756       Wound (used by OP [facility] only) 11/11/22 1019 Right 1 toe neuropathic ulcer (Active)   Thickness/Stage partial thickness 12/05/22 0812   Base dry;scab 01/09/23 0756   Periwound intact 12/05/22 0812   Periwound Temperature warm 12/05/22 0812   Periwound Skin Turgor soft 12/05/22 0812   Edges callused 01/09/23 0756   Length (cm) 0 12/26/22 0807   Width (cm) 0 12/26/22 0807   Depth (cm) 0 12/26/22 0807   Wound (cm^2) 0 cm^2 12/26/22 0807   Wound Volume (cm^3) 0 cm^3 12/26/22 0807   Wound healing % 100 12/26/22 0807   Drainage Characteristics/Odor serosanguineous 12/05/22 0812   Drainage Amount none 12/05/22 0812   Care, Wound non-select wound debridement performed 11/11/22 1000     Alert and appropriate.  Here with her daughter who is an RN at [facility] OR.  Very comfortable.  No complaints.    Plantar left great toe ulcer is improved.  Moderate amount of fibrin and bioburden.  No undermining.  No swelling.  No cellulitis.  Small callus and ulcer over the tip of the right great toe with no swelling or erythema.    Procedure:   Patient was determined to be capable of making their own medical decisions and informed consent was obtained. Topical anesthetic of 4% lidocaine was applied,  debridement was performed using a #15 blade down to and including subcutaneous tissue.  All bioburden removed from the left great toe and skin edges slightly debrided.  Excellent bleeding.  No deep structures involved.  Also debrided the tip of the right great toe with a very small ulcer noted with excellent perfusion.  Bleeding controlled with light pressure. Patient tolerated procedure well.  Debridement less than 2 cm,    Impression: #1.  Improving plantar left great toe ulcer.  Medial fasciotomy appears to have helped offload.  However, patient needs to be much more aggressive with offloading.  We will discontinue the cast boot.  She may wear her soled slippers at home but her daughter will get a Dr. Williamson's insert and trim this down to offload further.  Continue with the same dressings.     #2.  Painful idiopathic peripheral neuropathy.  We will initiate her on gabapentin 100 mg nightly to see if this helps.    Plan: We will dress the wounds with Vashe and endoform-- with discussion of importance of offloading..  Patient will return to the clinic in 2 weeks time      Hakeem Mccabe MD on 2/20/2023 at 9:12 AM      Dictated using Dragon voice recognition software which may result in transcription errors          Further instructions from your care team       Tiffanie Merlos      1935      Patient was with [facility] fax [phone] to change 3 times weekly and daughter Sarah to change the other days.    Gabapentin   Start taking one 100mg capsule of Gabapentin every night. You can pick this up at [facility].     Vein Formula 1000mg.   Take one (1) 500mg capsule twice daily.   Order from www.[employer] or "[employer] or call [phone]. (The 1000mg is two (2) 500mg capsules)    High Protein Diet  A diet high in protein is important for wound healing, we recommend getting 90 grams of protein per day. Taking protein shakes or bars are a good way to get extra protein in your  diet.    Wound Dressing Change:  LEFT great toe  -Cleanse with mild unscented soap and water  -Apply Vashe-moistened gauze, let sit for 10 minutes, remove gauze (do not rinse)  -Moisturize skin on legs with high quality, hypoallergenic moisturizer  -Apply 1/4 of 2x2 Endoform AM to the wound  -Cover with 2x2 gauze and cover with 2 x 2 3/4 inch Medipore+Pad gauze dressing  -Then apply purple Stripe EdemaWear from toes to knee.  Change daily    EdemaWear   -Edema Wear should be worn 24/7 unless bathing/showering or changing the dressing.  -You will wash and reuse the EdemaWear.   -DO NOT CUT THE EDEMAWEAR. IF IT IS TOO LONG THEN CUFF THE EDEMAWEAR (the EdemaWear can shrink length wise with washing).     Hakeem Mccabe M.D. February 20, 2023    Call us at [phone] if you have any questions about your wounds, have redness or swelling around your wound, have a fever of 101 or greater or if you have any other problems or concerns. We answer the phone Monday through Friday 8 am to 4 pm, please leave a message as we check the voicemail frequently throughout the day.     If you had a positive experience please indicate that on your patient satisfaction survey form that [employer] will be sending you.    It was a pleasure meeting with you today.  Thank you for allowing me and my team the privilege of caring for you today.  YOU are the reason we are here, and I truly hope we provided you with the excellent service you deserve.  Please let us know if there is anything else we can do for you so that we can be sure you are leaving completely satisfied with your care experience.      If you have any billing related questions please call the [facility] Business office at [phone]. The clinic staff does not handle billing related matters.    If you are scheduled to have a follow up appointment, you will receive a reminder call the day before your visit. On the appointment day please arrive 15 minutes prior to your  appointment time. If you are unable to keep that appointment, please call the clinic to cancel or reschedule. If you are more than 10 minutes late or greater for your appointment, the clinic policy is that you may be asked to reschedule.

## 2023-02-27 ENCOUNTER — MEDICAL CORRESPONDENCE (OUTPATIENT)
Dept: HEALTH INFORMATION MANAGEMENT | Facility: CLINIC | Age: 88
End: 2023-02-27
Payer: MEDICARE

## 2023-03-27 ENCOUNTER — HOSPITAL ENCOUNTER (OUTPATIENT)
Dept: WOUND CARE | Facility: CLINIC | Age: 88
Discharge: HOME | End: 2023-03-27
Attending: SURGERY | Admitting: SURGERY
Payer: MEDICARE

## 2023-03-27 VITALS — SYSTOLIC BLOOD PRESSURE: 110 MMHG | DIASTOLIC BLOOD PRESSURE: 65 MMHG | TEMPERATURE: 96.6 F

## 2023-03-27 DIAGNOSIS — L97.522 CHRONIC TOE ULCER, LEFT, WITH FAT LAYER EXPOSED (H): Primary | ICD-10-CM

## 2023-03-27 PROCEDURE — 11042 DBRDMT SUBQ TIS 1ST 20SQCM/<: CPT | Mod: 58 | Performed by: SURGERY

## 2023-03-27 PROCEDURE — 11042 DBRDMT SUBQ TIS 1ST 20SQCM/<: CPT | Performed by: SURGERY

## 2023-03-27 PROCEDURE — 97602 WOUND(S) CARE NON-SELECTIVE: CPT

## 2023-03-27 NOTE — PROGRESS NOTES
[facility] Wound Healing [place] Progress Note    Subject: Tiffanie Merlos and her daughter returns for follow-up of her left plantar toe ulcer.  She has been able to return to her home in [facility] but came up with her daughter for her visit today.    They have been using endoform antimicrobial on the plantar left great toe ulcer.  She has been doing a better job with offloading and not walking around barefoot/stocking foot but always wearing her shoes which has allowed almost complete healing of this ulcer.    Also had an irritated area on the right distal great toe that is healed.    She has somewhat thickened toenails and the left great toenail cracked causing a pressure ulcer of the tip of the left great toe.  No signs of infection.    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  Exam:  /65 (BP Location: Left arm, Patient Position: Sitting)   Temp (!) 96.6  F (35.9  C)   Wound (used by OP [facility] only) 11/11/22 1014 Left 1 toe neuropathic ulcer (Active)   Thickness/Stage full thickness 03/27/23 0951   Base pink 03/27/23 0951   Periwound intact 03/27/23 0951   Periwound Temperature warm 03/27/23 0951   Periwound Skin Turgor soft 03/27/23 0951   Edges open 01/09/23 0756   Length (cm) 0.5 03/27/23 0951   Width (cm) 0.2 03/27/23 0951   Depth (cm) 0.2 03/27/23 0951   Wound (cm^2) 0.1 cm^2 03/27/23 0951   Wound Volume (cm^3) 0.02 cm^3 03/27/23 0951    Wound healing % 99.18 03/27/23 0951   Undermining [Depth (cm)/Location] 9 oclock 0.2 02/20/23 0906   Drainage Characteristics/Odor serous 03/27/23 0951   Drainage Amount small 03/27/23 0951   Care, Wound debrided 03/27/23 0951       Wound (used by OP [facility] only) 11/11/22 1019 Right 1 toe neuropathic ulcer (Active)   Thickness/Stage full thickness 03/27/23 0951   Base pink 03/27/23 0951   Periwound macerated 03/27/23 0951   Periwound Temperature warm 03/27/23 0951   Periwound Skin Turgor soft 03/27/23 0951   Edges callused 01/09/23 0756   Length (cm) 0.4 03/27/23 0951   Width (cm) 0.3 03/27/23 0951   Depth (cm) 0.1 03/27/23 0951   Wound (cm^2) 0.12 cm^2 03/27/23 0951   Wound Volume (cm^3) 0.01 cm^3 03/27/23 0951   Wound healing % -1100 03/27/23 0951   Drainage Characteristics/Odor serous 03/27/23 0951   Drainage Amount moderate 03/27/23 0951   Care, Wound non-select wound debridement performed 03/27/23 0951     Alert and appropriate.  Here with her daughter who is a PACU nurse.  Tip of right great toe is completely healed and looks excellent.  Plantar left great toe has a small amount of callus with a significant improvement of the ulcer.  Callus is in the base of the wound and no signs of excessive pressure.  Thickened cracked left great toenail causing a pressure medial ulcer that is also very superficial.    Procedure:   Patient was determined to be capable of making their own medical decisions and informed consent was obtained. Topical anesthetic of 4% lidocaine was applied, debridement was performed using a #15 blade down to and including subcutaneous tissue.  Removed all callus.  Ulcer is very superficial now measuring 0.4 x 0.3 x 0.2 cm.  Excellent bleeding.  Bleeding controlled with light pressure. Patient tolerated procedure well.    We also used a  to trim back the crack portion of her left great toe revealing a very superficial ulcer but no longer has any pressure on this.    Impression: #1.   Significant improvement of plantar left great toe ulcer.  This was caused by pressure and and healing quite well with her offloading.  We will continue using the endoform antimicrobial after rinsing with Vashe.  This may become more difficult to apply due to the decreased depth of the ulcer and if so PleuroGel would be adequate followed by a Band-Aid.     #2.  Pressure ulcer over left great toe from toenail has been trimmed back.  PleuroGel and Band-Aid to the site.    Plan: We will dress the wounds with above.  Patient will return to the clinic as needed since the plantar ulcer has almost healed.  She can obtain care with her podiatrist closer to her home to avoid the drive up to the [place] [employer].  Discussed with patient and her daughter Sarah Palacio Delbert Mccabe MD on 3/27/2023 at 10:04 AM        Dictated using Dragon voice recognition software which may result in transcription errors          Further instructions from your care team       Tiffanie Merlos      1935    A DME order was not completed because supplies were not needed    Patient was with [facility] fax [phone] to change  x 3 times weekly and  daughter Sarah to change the other days.    Gabapentin  Continue taking one 100mg capsule of Gabapentin every night. You can pick this up at  [facility].    Vein Formula 1000mg.  Take one (1) 500mg capsule twice daily.  Order from www.[employer] or [employer] or call [phone],  the 1000 mg is two (2) 500mg capsules)    High Protein Diet  A diet high in protein is important for wound healing, we recommend getting 90  grams of protein per day. Taking protein shakes or bars are a good way to get extra  protein in your diet.    Wound Dressing Change:  LEFT and RIGHT great toe  - Wash your hands with soap and water before you begin your dressing change and prepare a clean surface for dressings.  - Cleanse with mild unscented soap and water  - Apply Vashe-moistened gauze, let  sit for 10 minutes, remove gauze (do not rinse)  - Moisturize skin on legs with high quality hypoallergenic moisturizer cream like CeraVe  - Apply 1/4 of 2x2 Endoform AM to the wound  - Cover with 2x2 gauze and cover with 2 x 2 3/4 inch Medipore+Pad gauze dressing  - Then apply purple Stripe EdemaWear from toes to knee.  Change daily    EdemaWear  -Edema Wear should be worn 24/7 unless bathing/showering or changing the  dressing.  -You will wash and reuse the EdemaWear.  -DO NOT CUT THE EDEMAWEAR. IF IT IS TOO LONG THEN CUFF THE EDEMAWEAR  (the EdemaWear can shrink length wise with washing).     Hakeem Mccabe M.D. March 27, 2023    Call us at [phone] if you have any questions about your wounds, have redness or swelling around your wound, have a fever of 101 or greater or if you have any other problems or concerns. We answer the phone Monday through Friday 8 am to 4 pm, please leave a message as we check the voicemail frequently throughout the day.     If you had a positive experience please indicate that on your patient satisfaction survey form that [facility] will be sending you.    It was a pleasure meeting with you today.  Thank you for allowing me and my team the privilege of caring for you today.  YOU are the reason we are here, and I truly hope we provided you with the excellent service you deserve.  Please let us know if there is anything else we can do for you so that we can be sure you are leaving completely satisfied with your care experience.      If you have any billing related questions please call the [facility] Business office at [phone]. The clinic staff does not handle billing related matters.    If you are scheduled to have a follow up appointment, you will receive a reminder call the day before your visit. On the appointment day please arrive 15 minutes prior to your appointment time. If you are unable to keep that appointment, please call the clinic to cancel or reschedule. If  you are more than 10 minutes late or greater for your appointment, the clinic policy is that you may be asked to reschedule.

## 2023-03-27 NOTE — DISCHARGE INSTRUCTIONS
Tiffanie Merlos      1935      Patient used to be with [facility] fax [phone] to change  x 3 times weekly and  daughter Sarah to change the other days.    Gabapentin  Continue taking one 100mg capsule of Gabapentin every night. You can pick this up at  [place] pharmacy.    Vein Formula 1000mg.  Take one (1) 500mg capsule twice daily.  Order from www.[employer] or [employer] or call [phone],  the 1000 mg is two (2) 500mg capsules)    High Protein Diet  A diet high in protein is important for wound healing, we recommend getting 90  grams of protein per day. Taking protein shakes or bars are a good way to get extra  protein in your diet.    Wound Dressing Change:  LEFT and RIGHT great toe  - Wash your hands with soap and water before you begin your dressing change and prepare a clean surface for dressings.  - Cleanse with mild unscented soap and water  - Apply Vashe-moistened gauze, let sit for 10 minutes, remove gauze (do not rinse)  - Moisturize skin on legs with high quality hypoallergenic moisturizer cream like CeraVe  - Apply 1/4 of 2x2 Endoform AM to the wound  - cover with 2x2 piece of gauze and secure with Medipore tape  - SpandaGrip /Tubigrip size F  Change every other day    Put plurogel in the fridge where it becomes thinner. You will use less of the plurogel this way, extending the use of the tube, and the plurogel will be more soothing.  To Order more plurogel: shop.[employer] or call [phone] to place an order for 0.7 oz tube  Use discount code PLUROHEALS (all caps) at checkout in the discount code section to decrease price to $55    EdemaWear, if you decide to wear again  Used to wear purple Stripe EdemaWear from toes to knee.  -Edema Wear should be worn 24/7 unless bathing/showering or changing the  dressing.  -You will wash and reuse the EdemaWear.  -DO NOT CUT THE EDEMAWEAR. IF IT IS TOO LONG THEN CUFF THE EDEMAWEAR  (the EdemaWear can shrink length wise with  washing).     Hakeem Mccabe M.D. March 27, 2023    Call us at [phone] if you have any questions about your wounds, have redness or swelling around your wound, have a fever of 101 or greater or if you have any other problems or concerns. We answer the phone Monday through Friday 8 am to 4 pm, please leave a message as we check the voicemail frequently throughout the day.     If you had a positive experience please indicate that on your patient satisfaction survey form that [facility] will be sending you.    It was a pleasure meeting with you today.  Thank you for allowing me and my team the privilege of caring for you today.  YOU are the reason we are here, and I truly hope we provided you with the excellent service you deserve.  Please let us know if there is anything else we can do for you so that we can be sure you are leaving completely satisfied with your care experience.      If you have any billing related questions please call the [facility] Business office at [phone]. The clinic staff does not handle billing related matters.    If you are scheduled to have a follow up appointment, you will receive a reminder call the day before your visit. On the appointment day please arrive 15 minutes prior to your appointment time. If you are unable to keep that appointment, please call the clinic to cancel or reschedule. If you are more than 10 minutes late or greater for your appointment, the clinic policy is that you may be asked to reschedule.

## 2023-04-15 ENCOUNTER — HEALTH MAINTENANCE LETTER (OUTPATIENT)
Age: 88
End: 2023-04-15

## 2024-06-22 ENCOUNTER — HEALTH MAINTENANCE LETTER (OUTPATIENT)
Age: 89
End: 2024-06-22

## 2025-07-12 ENCOUNTER — HEALTH MAINTENANCE LETTER (OUTPATIENT)
Age: 89
End: 2025-07-12